# Patient Record
Sex: FEMALE | Race: WHITE | NOT HISPANIC OR LATINO | Employment: FULL TIME | URBAN - METROPOLITAN AREA
[De-identification: names, ages, dates, MRNs, and addresses within clinical notes are randomized per-mention and may not be internally consistent; named-entity substitution may affect disease eponyms.]

---

## 2017-05-09 ENCOUNTER — GENERIC CONVERSION - ENCOUNTER (OUTPATIENT)
Dept: OTHER | Facility: OTHER | Age: 57
End: 2017-05-09

## 2017-05-11 ENCOUNTER — ALLSCRIPTS OFFICE VISIT (OUTPATIENT)
Dept: OTHER | Facility: OTHER | Age: 57
End: 2017-05-11

## 2017-05-18 ENCOUNTER — GENERIC CONVERSION - ENCOUNTER (OUTPATIENT)
Dept: OTHER | Facility: OTHER | Age: 57
End: 2017-05-18

## 2017-05-31 ENCOUNTER — GENERIC CONVERSION - ENCOUNTER (OUTPATIENT)
Dept: OTHER | Facility: OTHER | Age: 57
End: 2017-05-31

## 2018-01-14 VITALS
WEIGHT: 182.25 LBS | BODY MASS INDEX: 36.74 KG/M2 | SYSTOLIC BLOOD PRESSURE: 126 MMHG | DIASTOLIC BLOOD PRESSURE: 70 MMHG | HEIGHT: 59 IN

## 2018-07-25 DIAGNOSIS — I10 ESSENTIAL HYPERTENSION: Primary | ICD-10-CM

## 2018-07-25 RX ORDER — TRIAMTERENE AND HYDROCHLOROTHIAZIDE 37.5; 25 MG/1; MG/1
TABLET ORAL
Qty: 90 TABLET | Refills: 0 | Status: SHIPPED | OUTPATIENT
Start: 2018-07-25 | End: 2018-10-23 | Stop reason: SDUPTHER

## 2018-07-25 NOTE — TELEPHONE ENCOUNTER
Please tell patient I refilled her blood pressure medication for 3 months but no refills after this until she comes in for a visit  Last visit was well over 1 year ago    She can schedule for health maintenance physical exam or follow up

## 2018-08-14 RX ORDER — MONTELUKAST SODIUM 10 MG/1
1 TABLET ORAL DAILY
COMMUNITY
Start: 2017-05-11 | End: 2022-01-12

## 2018-08-14 RX ORDER — ONDANSETRON 4 MG/1
4 TABLET, FILM COATED ORAL EVERY 8 HOURS
COMMUNITY
Start: 2015-09-02 | End: 2018-08-15 | Stop reason: ALTCHOICE

## 2018-08-14 RX ORDER — FAMOTIDINE 40 MG/1
40 TABLET, FILM COATED ORAL DAILY
Refills: 3 | COMMUNITY
Start: 2018-06-27

## 2018-08-14 RX ORDER — METHOCARBAMOL 750 MG/1
750 TABLET, FILM COATED ORAL DAILY
COMMUNITY
Start: 2018-04-04

## 2018-08-14 RX ORDER — LEVALBUTEROL INHALATION SOLUTION 1.25 MG/3ML
1.25 SOLUTION RESPIRATORY (INHALATION) AS NEEDED
COMMUNITY
Start: 2017-05-11

## 2018-08-14 RX ORDER — ACETAMINOPHEN 325 MG/1
325 TABLET ORAL
COMMUNITY
Start: 2014-05-08

## 2018-08-14 RX ORDER — OMEPRAZOLE 40 MG/1
1 CAPSULE, DELAYED RELEASE ORAL DAILY
COMMUNITY
Start: 2017-05-11 | End: 2019-09-12

## 2018-08-14 RX ORDER — GABAPENTIN 300 MG/1
300 CAPSULE ORAL 2 TIMES DAILY
Refills: 3 | COMMUNITY
Start: 2018-06-04

## 2018-08-14 RX ORDER — PREDNISONE 1 MG/1
5 TABLET ORAL DAILY
Refills: 3 | COMMUNITY
Start: 2018-06-27

## 2018-08-14 RX ORDER — DIPHENHYDRAMINE HCL 25 MG
25 CAPSULE ORAL
COMMUNITY
Start: 2014-05-08

## 2018-08-14 RX ORDER — GUAIFENESIN 100 MG/5ML
1 SYRUP ORAL
COMMUNITY
Start: 2014-05-08

## 2018-08-14 RX ORDER — PAROXETINE 30 MG/1
1 TABLET, FILM COATED ORAL DAILY
COMMUNITY
Start: 2017-05-11 | End: 2018-08-15 | Stop reason: SDUPTHER

## 2018-08-14 RX ORDER — PREDNISONE 1 MG/1
3 TABLET ORAL DAILY
Refills: 3 | COMMUNITY
Start: 2018-06-27 | End: 2019-09-12

## 2018-08-14 RX ORDER — IBUPROFEN 600 MG/1
600 TABLET ORAL EVERY 6 HOURS
COMMUNITY
End: 2019-09-12

## 2018-08-14 RX ORDER — LEVALBUTEROL TARTRATE 45 UG/1
2 AEROSOL, METERED ORAL AS NEEDED
COMMUNITY
Start: 2018-07-20

## 2018-08-14 RX ORDER — MOMETASONE FUROATE AND FORMOTEROL FUMARATE DIHYDRATE 200; 5 UG/1; UG/1
2 AEROSOL RESPIRATORY (INHALATION) AS NEEDED
COMMUNITY
Start: 2018-07-19

## 2018-08-14 RX ORDER — SUMATRIPTAN 100 MG/1
1 TABLET, FILM COATED ORAL
COMMUNITY
Start: 2017-09-13 | End: 2018-08-15 | Stop reason: SDUPTHER

## 2018-08-15 ENCOUNTER — OFFICE VISIT (OUTPATIENT)
Dept: FAMILY MEDICINE CLINIC | Facility: CLINIC | Age: 58
End: 2018-08-15
Payer: COMMERCIAL

## 2018-08-15 VITALS
HEART RATE: 104 BPM | SYSTOLIC BLOOD PRESSURE: 120 MMHG | HEIGHT: 60 IN | DIASTOLIC BLOOD PRESSURE: 70 MMHG | OXYGEN SATURATION: 98 % | BODY MASS INDEX: 35.53 KG/M2 | WEIGHT: 181 LBS

## 2018-08-15 DIAGNOSIS — E78.01 FAMILIAL HYPERCHOLESTEROLEMIA: ICD-10-CM

## 2018-08-15 DIAGNOSIS — J45.40 MODERATE PERSISTENT ASTHMA, UNSPECIFIED WHETHER COMPLICATED: ICD-10-CM

## 2018-08-15 DIAGNOSIS — Z86.69 HX OF MIGRAINE HEADACHES: ICD-10-CM

## 2018-08-15 DIAGNOSIS — M35.3 POLYMYALGIA RHEUMATICA (HCC): ICD-10-CM

## 2018-08-15 DIAGNOSIS — Z12.39 SCREENING FOR BREAST CANCER: ICD-10-CM

## 2018-08-15 DIAGNOSIS — Z12.11 SCREENING FOR COLON CANCER: ICD-10-CM

## 2018-08-15 DIAGNOSIS — F41.9 ANXIETY: Primary | ICD-10-CM

## 2018-08-15 PROBLEM — N95.1 MENOPAUSAL SYMPTOM: Status: ACTIVE | Noted: 2017-05-11

## 2018-08-15 PROBLEM — M51.36 LUMBAR DEGENERATIVE DISC DISEASE: Status: ACTIVE | Noted: 2017-05-09

## 2018-08-15 PROBLEM — J45.909 ASTHMA: Status: ACTIVE | Noted: 2017-05-11

## 2018-08-15 PROBLEM — K21.9 GERD (GASTROESOPHAGEAL REFLUX DISEASE): Status: ACTIVE | Noted: 2018-06-24

## 2018-08-15 PROBLEM — R51.9 HEADACHE: Status: ACTIVE | Noted: 2017-09-13

## 2018-08-15 PROBLEM — M51.369 LUMBAR DEGENERATIVE DISC DISEASE: Status: ACTIVE | Noted: 2017-05-09

## 2018-08-15 PROCEDURE — 1036F TOBACCO NON-USER: CPT | Performed by: FAMILY MEDICINE

## 2018-08-15 PROCEDURE — 3008F BODY MASS INDEX DOCD: CPT | Performed by: FAMILY MEDICINE

## 2018-08-15 PROCEDURE — 99214 OFFICE O/P EST MOD 30 MIN: CPT | Performed by: FAMILY MEDICINE

## 2018-08-15 RX ORDER — SUMATRIPTAN 100 MG/1
100 TABLET, FILM COATED ORAL ONCE AS NEEDED
Qty: 27 TABLET | Refills: 3 | Status: SHIPPED | OUTPATIENT
Start: 2018-08-15 | End: 2019-07-31 | Stop reason: SDUPTHER

## 2018-08-15 RX ORDER — MULTIVIT-MIN/IRON/FOLIC ACID/K 18-600-40
1 CAPSULE ORAL
COMMUNITY

## 2018-08-15 RX ORDER — PAROXETINE 30 MG/1
30 TABLET, FILM COATED ORAL DAILY
Qty: 90 TABLET | Refills: 3 | Status: SHIPPED | OUTPATIENT
Start: 2018-08-15 | End: 2019-07-31 | Stop reason: SDUPTHER

## 2018-08-15 NOTE — ASSESSMENT & PLAN NOTE
Asthma has been better controlled since she started using the [facility] inhaler  She will continue with home nebulizer treatments as needed

## 2018-08-15 NOTE — PROGRESS NOTES
Assessment/Plan:    Anxiety    Her symptoms are under good control on paroxetine which was refilled today  Polymyalgia rheumatica ([facility] [address] )    She is currently doing a very gradual prednisone taper  She will continue follow-up with her rheumatologist at [facility]  Asthma    Asthma has been better controlled since she started using the [facility] inhaler  She will continue with home nebulizer treatments as needed  Diagnoses and all orders for this visit:    Anxiety  -     PARoxetine (PAXIL) 30 mg tablet; Take 1 tablet (30 mg total) by mouth daily    Polymyalgia rheumatica (HCC)    Moderate persistent asthma, unspecified whether complicated    Hx of migraine headaches  -     SUMAtriptan (IMITREX) 100 mg tablet; Take 1 tablet (100 mg total) by mouth once as needed for migraine for up to 1 dose    Screening for breast cancer  -     Mammo screening bilateral w 3d & cad; Future    Screening for colon cancer  -     Occult Bloood,Fecal Immunochemical; Future    Familial hypercholesterolemia  -     Lipid panel; Future  -     Hepatitis C antibody; Future    Other orders  -     Cholecalciferol (VITAMIN D) 2000 units CAPS; Take 2 capsules by mouth          Subjective:      Patient ID: Shonda Wood is a 61 y o  female  She is here for follow-up  She has been having troubles with fibromyalgia flaring  Currently she is taking prednisone 8 mg daily, which is down from 10 mg daily  In addition she has had difficulties with asthma symptoms  She has chronic allergic rhinitis and has had asthma for the past 6 years or so  Recently she started using the [facility] inhaler which has been quite helpful  She went from exacerbations to 3 times per week to once every other week  She is overdue for mammogram but willing to schedule 1  She has never had a colonoscopy  She is willing to do a fit test     The Paxil has been managing for anxiety  Her semester will be starting next week    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  The following portions of the patient's history were reviewed and updated as appropriate: allergies, current medications, past family history, past medical history, past social history, past surgical history and problem list     Review of Systems   Constitutional: Negative for activity change, chills, fatigue and fever  HENT: Positive for congestion  Negative for ear pain, sinus pressure and sore throat  Eyes: Negative for pain and visual disturbance  Respiratory: Negative for cough, chest tightness, shortness of breath and wheezing  Cardiovascular: Negative for chest pain, palpitations and leg swelling  Gastrointestinal: Negative for abdominal pain, blood in stool, constipation, diarrhea, nausea and vomiting  Endocrine: Negative for polydipsia and polyuria  Genitourinary: Negative for difficulty urinating, dysuria, frequency and urgency  Musculoskeletal: Positive for arthralgias and myalgias  Negative for joint swelling  Skin: Negative for rash  Neurological: Positive for headaches  Negative for dizziness, weakness and numbness  Hematological: Negative for adenopathy  Does not bruise/bleed easily  Psychiatric/Behavioral: Negative for dysphoric mood  The patient is not nervous/anxious  Objective:      /70   Pulse 104   Ht 5' (1 524 m)   Wt 82 1 kg (181 lb)   SpO2 98%   BMI 35 35 kg/m²          Physical Exam   Constitutional: She is oriented to person, place, and time  She appears well-developed and well-nourished  No distress  HENT:   Head: Normocephalic and atraumatic  Right Ear: External ear normal    Left Ear: External ear normal    Nose: Nose normal    Mouth/Throat: Oropharynx is clear and moist    Eyes: Conjunctivae and EOM are normal  Pupils are equal, round, and reactive to light  No scleral icterus  Neck: Normal range of motion  Neck supple  No thyromegaly present     Cardiovascular: Normal rate, regular rhythm and normal heart sounds  No murmur heard  Pulmonary/Chest: Effort normal and breath sounds normal  She has no wheezes  She has no rales  Abdominal: Soft  Bowel sounds are normal  She exhibits no mass  There is no tenderness  Musculoskeletal: She exhibits no tenderness or deformity  Lymphadenopathy:     She has no cervical adenopathy  Neurological: She is alert and oriented to person, place, and time  She has normal reflexes  No cranial nerve deficit  Skin: Skin is warm and dry  No rash noted  No erythema  Psychiatric: She has a normal mood and affect  Her behavior is normal    Nursing note and vitals reviewed

## 2018-08-15 NOTE — PATIENT INSTRUCTIONS
There is a new shingles vaccine available called Shingrix  It is recommended after age 48, even if you have already had the Zostavax shingles vaccine  First you should contact your insurance to make sure it is covered, and then you can go to the pharmacy for the vaccine  There are 2 doses of vaccine  by 2-6 months

## 2018-08-15 NOTE — ASSESSMENT & PLAN NOTE
She is currently doing a very gradual prednisone taper  She will continue follow-up with her rheumatologist at [facility] [place] [employer]

## 2018-10-04 DIAGNOSIS — E78.00 HYPERCHOLESTEREMIA: Primary | ICD-10-CM

## 2018-10-04 RX ORDER — EZETIMIBE 10 MG/1
10 TABLET ORAL DAILY
Qty: 30 TABLET | Refills: 3 | Status: SHIPPED | OUTPATIENT
Start: 2018-10-04 | End: 2019-09-12 | Stop reason: SINTOL

## 2018-10-23 DIAGNOSIS — I10 ESSENTIAL HYPERTENSION: ICD-10-CM

## 2018-10-23 RX ORDER — TRIAMTERENE AND HYDROCHLOROTHIAZIDE 37.5; 25 MG/1; MG/1
TABLET ORAL
Qty: 90 TABLET | Refills: 0 | Status: SHIPPED | OUTPATIENT
Start: 2018-10-23 | End: 2019-01-11 | Stop reason: SDUPTHER

## 2019-01-11 DIAGNOSIS — I10 ESSENTIAL HYPERTENSION: ICD-10-CM

## 2019-01-12 RX ORDER — TRIAMTERENE AND HYDROCHLOROTHIAZIDE 37.5; 25 MG/1; MG/1
TABLET ORAL
Qty: 90 TABLET | Refills: 0 | Status: SHIPPED | OUTPATIENT
Start: 2019-01-12 | End: 2019-04-01 | Stop reason: SDUPTHER

## 2019-02-28 ENCOUNTER — APPOINTMENT (OUTPATIENT)
Dept: URGENT CARE | Age: 59
End: 2019-02-28
Payer: OTHER MISCELLANEOUS

## 2019-02-28 ENCOUNTER — APPOINTMENT (OUTPATIENT)
Dept: RADIOLOGY | Age: 59
End: 2019-02-28
Payer: OTHER MISCELLANEOUS

## 2019-02-28 ENCOUNTER — TRANSCRIBE ORDERS (OUTPATIENT)
Dept: ADMINISTRATIVE | Age: 59
End: 2019-02-28

## 2019-02-28 DIAGNOSIS — T14.90XA INJURY: Primary | ICD-10-CM

## 2019-02-28 DIAGNOSIS — T14.90XA INJURY: ICD-10-CM

## 2019-02-28 PROCEDURE — 73080 X-RAY EXAM OF ELBOW: CPT

## 2019-02-28 PROCEDURE — 99284 EMERGENCY DEPT VISIT MOD MDM: CPT | Performed by: PREVENTIVE MEDICINE

## 2019-02-28 PROCEDURE — G0383 LEV 4 HOSP TYPE B ED VISIT: HCPCS | Performed by: PREVENTIVE MEDICINE

## 2019-03-01 ENCOUNTER — APPOINTMENT (OUTPATIENT)
Dept: URGENT CARE | Age: 59
End: 2019-03-01
Payer: OTHER MISCELLANEOUS

## 2019-03-01 PROCEDURE — 99213 OFFICE O/P EST LOW 20 MIN: CPT | Performed by: PREVENTIVE MEDICINE

## 2019-03-11 ENCOUNTER — APPOINTMENT (OUTPATIENT)
Dept: URGENT CARE | Age: 59
End: 2019-03-11
Payer: OTHER MISCELLANEOUS

## 2019-03-11 PROCEDURE — 99214 OFFICE O/P EST MOD 30 MIN: CPT | Performed by: PREVENTIVE MEDICINE

## 2019-03-19 ENCOUNTER — APPOINTMENT (OUTPATIENT)
Dept: URGENT CARE | Age: 59
End: 2019-03-19
Payer: OTHER MISCELLANEOUS

## 2019-03-19 PROCEDURE — 99213 OFFICE O/P EST LOW 20 MIN: CPT | Performed by: PREVENTIVE MEDICINE

## 2019-04-01 DIAGNOSIS — I10 ESSENTIAL HYPERTENSION: ICD-10-CM

## 2019-04-01 RX ORDER — TRIAMTERENE AND HYDROCHLOROTHIAZIDE 37.5; 25 MG/1; MG/1
TABLET ORAL
Qty: 90 TABLET | Refills: 0 | Status: SHIPPED | OUTPATIENT
Start: 2019-04-01 | End: 2019-07-09 | Stop reason: SDUPTHER

## 2019-07-09 DIAGNOSIS — I10 ESSENTIAL HYPERTENSION: ICD-10-CM

## 2019-07-09 RX ORDER — TRIAMTERENE AND HYDROCHLOROTHIAZIDE 37.5; 25 MG/1; MG/1
TABLET ORAL
Qty: 90 TABLET | Refills: 0 | Status: SHIPPED | OUTPATIENT
Start: 2019-07-09 | End: 2019-10-07 | Stop reason: SDUPTHER

## 2019-07-10 NOTE — TELEPHONE ENCOUNTER
L/m for pt to call office and schedule appt - prior to requesting further medication refills - mailed letter also

## 2019-07-31 DIAGNOSIS — Z86.69 HX OF MIGRAINE HEADACHES: ICD-10-CM

## 2019-07-31 DIAGNOSIS — F41.9 ANXIETY: ICD-10-CM

## 2019-07-31 RX ORDER — PAROXETINE 30 MG/1
TABLET, FILM COATED ORAL
Qty: 90 TABLET | Refills: 3 | Status: SHIPPED | OUTPATIENT
Start: 2019-07-31 | End: 2021-01-06 | Stop reason: ALTCHOICE

## 2019-07-31 RX ORDER — SUMATRIPTAN 100 MG/1
TABLET, FILM COATED ORAL
Qty: 27 TABLET | Refills: 3 | Status: SHIPPED | OUTPATIENT
Start: 2019-07-31 | End: 2020-08-07

## 2019-09-09 LAB
HEPATITIS C ANTIBODY (HISTORICAL): NEGATIVE
HEPATITIS C ANTIBODY CONFIRMATION (HISTORICAL): NEGATIVE

## 2019-09-12 ENCOUNTER — OFFICE VISIT (OUTPATIENT)
Dept: FAMILY MEDICINE CLINIC | Facility: CLINIC | Age: 59
End: 2019-09-12
Payer: COMMERCIAL

## 2019-09-12 VITALS
OXYGEN SATURATION: 97 % | DIASTOLIC BLOOD PRESSURE: 70 MMHG | TEMPERATURE: 98.8 F | BODY MASS INDEX: 36.29 KG/M2 | SYSTOLIC BLOOD PRESSURE: 118 MMHG | HEART RATE: 90 BPM | WEIGHT: 180 LBS | HEIGHT: 59 IN

## 2019-09-12 DIAGNOSIS — M35.3 POLYMYALGIA RHEUMATICA (HCC): ICD-10-CM

## 2019-09-12 DIAGNOSIS — Z00.00 ANNUAL PHYSICAL EXAM: Primary | ICD-10-CM

## 2019-09-12 DIAGNOSIS — E66.9 OBESITY, CLASS II, BMI 35-39.9: ICD-10-CM

## 2019-09-12 DIAGNOSIS — Z23 NEED FOR INFLUENZA VACCINATION: ICD-10-CM

## 2019-09-12 DIAGNOSIS — Z23 FLU VACCINE NEED: ICD-10-CM

## 2019-09-12 DIAGNOSIS — Z12.39 BREAST CANCER SCREENING: ICD-10-CM

## 2019-09-12 DIAGNOSIS — E78.5 HYPERLIPIDEMIA, UNSPECIFIED HYPERLIPIDEMIA TYPE: ICD-10-CM

## 2019-09-12 DIAGNOSIS — K90.9 MALABSORPTION OF IRON: ICD-10-CM

## 2019-09-12 PROBLEM — D50.8 IRON DEFICIENCY ANEMIA SECONDARY TO INADEQUATE DIETARY IRON INTAKE: Status: ACTIVE | Noted: 2019-04-23

## 2019-09-12 PROBLEM — M31.6 GIANT CELL ARTERITIS SYNDROME (HCC): Status: ACTIVE | Noted: 2018-12-17

## 2019-09-12 PROCEDURE — 90471 IMMUNIZATION ADMIN: CPT | Performed by: FAMILY MEDICINE

## 2019-09-12 PROCEDURE — 90682 RIV4 VACC RECOMBINANT DNA IM: CPT | Performed by: FAMILY MEDICINE

## 2019-09-12 PROCEDURE — 99396 PREV VISIT EST AGE 40-64: CPT | Performed by: FAMILY MEDICINE

## 2019-09-12 NOTE — PATIENT INSTRUCTIONS

## 2019-09-12 NOTE — ASSESSMENT & PLAN NOTE
She continues with joint aches  She sees Rheumatology Dr Kaci Romo regularly  She is stable on prednisone 5 mg daily

## 2019-09-12 NOTE — PROGRESS NOTES
ADULT ANNUAL [facility]    NAME: Mili Workman  AGE: 60 y o  SEX: female  : 1960     DATE: 2019     Assessment and Plan:     Problem List Items Addressed This Visit        Digestive    Malabsorption of iron     She has received iron transfusions and most recent hemoglobin was normal at 13 4  Other    Polymyalgia rheumatica ([address] )     She continues with joint aches  She sees Rheumatology Dr Kirsten Scott regularly  She is stable on prednisone 5 mg daily  Other Visit Diagnoses     Breast cancer screening    -  Primary    Need for influenza vaccination              Immunizations and preventive care screenings were discussed with patient today  Appropriate education was printed on patient's after visit summary  Counseling:  · Exercise: the importance of regular exercise/physical activity was discussed  Recommend exercise 3-5 times per week for at least 30 minutes  BMI Counseling: Body mass index is 36 05 kg/m²  The BMI is above normal  Nutrition recommendations include decreasing portion sizes and encouraging healthy choices of fruits and vegetables  Exercise recommendations include exercising 3-5 times per week  Patient referred to PCP due to patient being overweight  No follow-ups on file  Chief Complaint:     Chief Complaint   Patient presents with    Annual Exam      History of Present Illness:     Adult Annual Physical   Patient here for a comprehensive physical exam  The patient reports no problems  Diet and Physical Activity  · Diet/Nutrition: well balanced diet and adequate fiber intake  · Exercise: walking        Depression Screening  PHQ-9 Depression Screening    PHQ-9:    Frequency of the following problems over the past two weeks:       Little interest or pleasure in doing things:  0 - not at all  Feeling down, depressed, or hopeless:  0 - not at all  PHQ-2 Score: 0       General Health  · Sleep: sleeps well and gets 7-8 hours of sleep on average  · Hearing: normal - bilateral   · Vision: goes for regular eye exams and wears glasses  · Dental: regular dental visits, brushes teeth twice daily and flosses teeth occasionally  /GYN Health  · Patient is: postmenopausal  · Last menstrual period: 2011  · Contraceptive method: none  Review of Systems:     Review of Systems   Constitutional: Negative for activity change, chills, fatigue and fever  HENT: Positive for congestion  Negative for ear pain, sinus pressure and sore throat  Eyes: Negative for pain and visual disturbance  Respiratory: Positive for cough  Negative for chest tightness, shortness of breath and wheezing  Cardiovascular: Negative for chest pain, palpitations and leg swelling  Gastrointestinal: Negative for abdominal pain, blood in stool, constipation, diarrhea, nausea and vomiting  Endocrine: Negative for polydipsia and polyuria  Genitourinary: Negative for difficulty urinating, dysuria, frequency and urgency  Musculoskeletal: Positive for myalgias  Negative for arthralgias and joint swelling  Skin: Negative for rash  Neurological: Negative for dizziness, weakness, numbness and headaches  Hematological: Negative for adenopathy  Does not bruise/bleed easily  Psychiatric/Behavioral: Negative for dysphoric mood  The patient is not nervous/anxious  Past Medical History:     No past medical history on file     Past Surgical History:     Past Surgical History:   Procedure Laterality Date    ARTHROTOMY KNEE Right 06/20/2014    medial meniscectomy    CERVICAL FUSION  2013    vertebral, l4 l5 also cyst removal    REPLACEMENT TOTAL KNEE  10/2015      Social History:     Social History     Socioeconomic History    Marital status: /Civil Union     Spouse name: None    Number of children: None    Years of education: None    Highest education level: None   Occupational History    None   Social Needs    Financial resource strain: None    Food insecurity:     Worry: None     Inability: None    Transportation needs:     Medical: None     Non-medical: None   Tobacco Use    Smoking status: Never Smoker    Smokeless tobacco: Never Used   Substance and Sexual Activity    Alcohol use: Yes     Frequency: Monthly or less     Comment: occ    Drug use: No    Sexual activity: None   Lifestyle    Physical activity:     Days per week: None     Minutes per session: None    Stress: None   Relationships    Social connections:     Talks on phone: None     Gets together: None     Attends Yazidi service: None     Active member of club or organization: None     Attends meetings of clubs or organizations: None     Relationship status: None    Intimate partner violence:     Fear of current or ex partner: None     Emotionally abused: None     Physically abused: None     Forced sexual activity: None   Other Topics Concern    None   Social History Narrative    Always uses seatbelt    Daily caffeine    No guns in the home      Family History:     Family History   Problem Relation Age of Onset    Hypertension Mother     Prostate cancer Father       Current Medications:     Current Outpatient Medications   Medication Sig Dispense Refill    acetaminophen (TYLENOL) 325 mg tablet Take 325 mg by mouth      Cholecalciferol (VITAMIN D) 2000 units CAPS Take 2 capsules by mouth      diphenhydrAMINE (BENADRYL) 25 mg capsule Take 25 mg by mouth      DULERA 200-5 MCG/ACT inhaler Inhale 2 puffs as needed       famotidine (PEPCID) 40 MG tablet Take 40 mg by mouth daily  3    gabapentin (NEURONTIN) 300 mg capsule Take 300 mg by mouth 2 (two) times a day   3    guaiFENesin (ROBITUSSIN) 100 mg/5 mL syrup Take 1 tablet by mouth      levalbuterol (XOPENEX HFA) 45 mcg/act inhaler Inhale 2 puffs as needed       levalbuterol (XOPENEX) 1 25 mg/3 mL nebulizer solution Inhale 1 25 mg as needed       methocarbamol (ROBAXIN) 750 mg tablet Take 750 mg by mouth as needed       montelukast (SINGULAIR) 10 mg tablet Take 1 tablet by mouth daily      PARoxetine (PAXIL) 30 mg tablet TAKE 1 TABLET DAILY 90 tablet 3    predniSONE 5 mg tablet Take 10 mg by mouth daily  3    SUMAtriptan (IMITREX) 100 mg tablet TAKE 1 TABLET ONCE AS      NEEDED FOR MIGRAINE FOR UP TO 1 DOSE (Patient taking differently: Take 100 mg by mouth once as needed ) 27 tablet 3    triamterene-hydrochlorothiazide (MAXZIDE-25) 37 5-25 mg per tablet TAKE 1 TABLET ONCE DAILY 90 tablet 0     No current facility-administered medications for this visit  Allergies: Allergies   Allergen Reactions    Fluticasone-Salmeterol Other (See Comments)     Other reaction(s): Shaking    Morphine      Other reaction(s): Hypotension    Pollen Extract     Simvastatin Myalgia    Statins Myalgia    Zetia [Ezetimibe]      Tiredness, nausea, body aches    Chlorhexidine Rash    Penicillins Rash    Sulfa Antibiotics Rash     Pt claims to not have allergy to sulfa drugs      Physical Exam:     /70 (BP Location: Left arm, Patient Position: Sitting, Cuff Size: Standard)   Pulse 90   Temp 98 8 °F (37 1 °C) (Tympanic)   Ht 4' 11 25" (1 505 m)   Wt 81 6 kg (180 lb)   SpO2 97%   BMI 36 05 kg/m²     Physical Exam   Constitutional: She is oriented to person, place, and time  She appears well-developed and well-nourished  No distress  obese   HENT:   Head: Normocephalic and atraumatic  Right Ear: External ear normal    Left Ear: External ear normal    Nose: Nose normal    Mouth/Throat: Oropharynx is clear and moist    Eyes: Pupils are equal, round, and reactive to light  Conjunctivae and EOM are normal  No scleral icterus  Neck: Normal range of motion  Neck supple  No thyromegaly present  Cardiovascular: Normal rate, regular rhythm, normal heart sounds and intact distal pulses  No murmur heard    Pulmonary/Chest: Effort normal and breath sounds normal  She has no wheezes  She has no rales  Abdominal: Soft  Bowel sounds are normal  She exhibits no mass  There is no tenderness  Musculoskeletal: She exhibits no tenderness or deformity  Lymphadenopathy:     She has no cervical adenopathy  Neurological: She is alert and oriented to person, place, and time  She has normal reflexes  No cranial nerve deficit  Skin: Skin is warm and dry  Capillary refill takes less than 2 seconds  No rash noted  No erythema  Psychiatric: She has a normal mood and affect  Her behavior is normal    Nursing note and vitals reviewed        MD [facility] [id number]

## 2019-10-07 DIAGNOSIS — I10 ESSENTIAL HYPERTENSION: ICD-10-CM

## 2019-10-07 RX ORDER — TRIAMTERENE AND HYDROCHLOROTHIAZIDE 37.5; 25 MG/1; MG/1
TABLET ORAL
Qty: 90 TABLET | Refills: 0 | Status: SHIPPED | OUTPATIENT
Start: 2019-10-07 | End: 2020-01-05

## 2019-11-21 ENCOUNTER — TELEPHONE (OUTPATIENT)
Dept: FAMILY MEDICINE CLINIC | Facility: CLINIC | Age: 59
End: 2019-11-21

## 2019-11-21 NOTE — TELEPHONE ENCOUNTER
Pt called back re lab results  Pt states she has previously tried zetia and had the same effects as the statin medications  Pt states she is unable to take fish oil as she is allergic to fish and in the past tried the fish oil OTC and broke out in hives    Pt also wanted to let you know she currently is getting iron infusions per her hematologist

## 2019-11-24 ENCOUNTER — OFFICE VISIT (OUTPATIENT)
Dept: URGENT CARE | Facility: MEDICAL CENTER | Age: 59
End: 2019-11-24
Payer: COMMERCIAL

## 2019-11-24 VITALS
HEIGHT: 60 IN | DIASTOLIC BLOOD PRESSURE: 65 MMHG | HEART RATE: 86 BPM | WEIGHT: 180 LBS | BODY MASS INDEX: 35.34 KG/M2 | SYSTOLIC BLOOD PRESSURE: 109 MMHG | TEMPERATURE: 98 F | RESPIRATION RATE: 16 BRPM | OXYGEN SATURATION: 95 %

## 2019-11-24 DIAGNOSIS — J00 ACUTE NASOPHARYNGITIS: Primary | ICD-10-CM

## 2019-11-24 LAB — S PYO AG THROAT QL: NEGATIVE

## 2019-11-24 PROCEDURE — 87070 CULTURE OTHR SPECIMN AEROBIC: CPT | Performed by: PHYSICIAN ASSISTANT

## 2019-11-24 PROCEDURE — 87880 STREP A ASSAY W/OPTIC: CPT | Performed by: PHYSICIAN ASSISTANT

## 2019-11-24 PROCEDURE — 99213 OFFICE O/P EST LOW 20 MIN: CPT | Performed by: PHYSICIAN ASSISTANT

## 2019-11-24 RX ORDER — METHYLPREDNISOLONE 4 MG/1
TABLET ORAL
Qty: 21 EACH | Refills: 0 | Status: SHIPPED | OUTPATIENT
Start: 2019-11-24 | End: 2021-01-06 | Stop reason: ALTCHOICE

## 2019-11-24 RX ORDER — BENZONATATE 100 MG/1
100 CAPSULE ORAL 3 TIMES DAILY PRN
Qty: 15 CAPSULE | Refills: 0 | Status: SHIPPED | OUTPATIENT
Start: 2019-11-24 | End: 2021-01-06 | Stop reason: ALTCHOICE

## 2019-11-24 NOTE — PATIENT INSTRUCTIONS
Take Medrol Dosepak as directed  Hold prednisone until finish with Dosepak  Use Tessalon as directed for cough  Start using nasal spray daily  Continue home medications as directed  Follow up with PCP in 3-5 days  Proceed to  ER if symptoms worsen  Upper Respiratory Infection   AMBULATORY CARE:   An upper respiratory infection  is also called a common cold  It can affect your nose, throat, ears, and sinuses  Common signs and symptoms include the following:  Cold symptoms are usually worst for the first 3 to 5 days  You may have any of the following:  · Runny or stuffy nose    · Sneezing and coughing    · Sore throat or hoarseness    · Red, watery, and sore eyes    · Fatigue     · Chills and fever    · Headache, body aches, or sore muscles  Seek care immediately if:   · You have chest pain or trouble breathing  Contact your healthcare provider if:   · You have a fever over 102ºF (39°C)  · Your sore throat gets worse or you see white or yellow spots in your throat  · Your symptoms get worse after 3 to 5 days or your cold is not better in 14 days  · You have a rash anywhere on your skin  · You have large, tender lumps in your neck  · You have thick, green or yellow drainage from your nose  · You cough up thick yellow, green, or bloody mucus  · You have vomiting for more than 24 hours and cannot keep fluids down  · You have a bad earache  · You have questions or concerns about your condition or care  Treatment for a cold: There is no cure for the common cold  Colds are caused by viruses and do not get better with antibiotics  Most people get better in 7 to 14 days  You may continue to cough for 2 to 3 weeks  The following may help decrease your symptoms:  · Decongestants  help reduce nasal congestion and help you breathe more easily  If you take decongestant pills, they may make you feel restless or not able to sleep   Do not use decongestant sprays for more than a few days     · Cough suppressants  help reduce coughing  Ask your healthcare provider which type of cough medicine is best for you  · NSAIDs , such as ibuprofen, help decrease swelling, pain, and fever  NSAIDs can cause stomach bleeding or kidney problems in certain people  If you take blood thinner medicine, always ask your healthcare provider if NSAIDs are safe for you  Always read the medicine label and follow directions  · Acetaminophen  decreases pain and fever  It is available without a doctor's order  Ask how much to take and how often to take it  Follow directions  Read the labels of all other medicines you are using to see if they also contain acetaminophen, or ask your doctor or pharmacist  Acetaminophen can cause liver damage if not taken correctly  Do not use more than 4 grams (4,000 milligrams) total of acetaminophen in one day  Manage your cold:   · Rest as much as possible  Slowly start to do more each day  · Drink more liquids as directed  Liquids will help thin and loosen mucus so you can cough it up  Liquids will also help prevent dehydration  Liquids that help prevent dehydration include water, fruit juice, and broth  Do not drink liquids that contain caffeine  Caffeine can increase your risk for dehydration  Ask your healthcare provider how much liquid to drink each day  · Soothe a sore throat  Gargle with warm salt water  This helps your sore throat feel better  Make salt water by dissolving ¼ teaspoon salt in 1 cup warm water  You may also suck on hard candy or throat lozenges  You may use a sore throat spray  · Use a humidifier or vaporizer  Use a cool mist humidifier or a vaporizer to increase air moisture in your home  This may make it easier for you to breathe and help decrease your cough  · Use saline nasal drops as directed  These help relieve congestion  · Apply petroleum-based jelly around the outside of your nostrils    This can decrease irritation from blowing your nose  · Do not smoke  Nicotine and other chemicals in cigarettes and cigars can make your symptoms worse  They can also cause infections such as bronchitis or pneumonia  Ask your healthcare provider for information if you currently smoke and need help to quit  E-cigarettes or smokeless tobacco still contain nicotine  Talk to your healthcare provider before you use these products  Prevent spreading your cold to others:   · Try to stay away from other people during the first 2 to 3 days of your cold when it is more easily spread  · Do not share food or drinks  · Do not share hand towels with household members  · Wash your hands often, especially after you blow your nose  Turn away from other people and cover your mouth and nose with a tissue when you sneeze or cough  Follow up with your healthcare provider as directed:  Write down your questions so you remember to ask them during your visits  © 2017 [address]  Information is for End User's use only and may not be sold, redistributed or otherwise used for commercial purposes  All illustrations and images included in CareNotes® are the copyrighted property of [employer]  or [employer]  The above information is an  only  It is not intended as medical advice for individual conditions or treatments  Talk to your doctor, nurse or pharmacist before following any medical regimen to see if it is safe and effective for you

## 2019-11-24 NOTE — PROGRESS NOTES
[facility]        NAME: Iman Beth is a 60 y o  female  : 1960    MRN: [id number]  DATE: 2019  TIME: 2:10 PM    Assessment and Plan   Acute nasopharyngitis [J00]  1  Acute nasopharyngitis  POCT rapid strepA    methylPREDNISolone 4 MG tablet therapy pack    benzonatate (TESSALON PERLES) 100 mg capsule    Throat culture         Patient Instructions     Take Medrol Dosepak as directed  Hold prednisone until finish with Dosepak  Use Tessalon as directed for cough  Start using nasal spray daily  Continue home medications as directed  Follow up with PCP in 3-5 days  Proceed to  ER if symptoms worsen  Chief Complaint     Chief Complaint   Patient presents with    Sore Throat     began yesterday with sore throat and head congestion         History of Present Illness       58-year-old female presents with 2 day history of runny nose head congestion and cough and sore throat  Denies any fevers or chills  No chest pain wheezing  No abdominal pain nausea vomiting diarrhea  Patient has history of asthma has will have her has not been using her nebulizers  Denies any ear pain  Reports that she thinks she gets the sinus infection and needs a Z-Chidi  Sinusitis   This is a new problem  The current episode started yesterday  The problem is unchanged  There has been no fever  The pain is mild  Associated symptoms include congestion, coughing, sinus pressure and a sore throat  Pertinent negatives include no chills, diaphoresis, ear pain or headaches  Past treatments include nothing  The treatment provided no relief  Review of Systems   Review of Systems   Constitutional: Negative  Negative for chills and diaphoresis  HENT: Positive for congestion, sinus pressure and sore throat  Negative for ear pain  Eyes: Negative  Respiratory: Positive for cough  Cardiovascular: Negative  Gastrointestinal: Negative  Musculoskeletal: Negative  Skin: Negative  Neurological: Negative  Negative for headaches           Current Medications       Current Outpatient Medications:     acetaminophen (TYLENOL) 325 mg tablet, Take 325 mg by mouth, Disp: , Rfl:     Cholecalciferol (VITAMIN D) 2000 units CAPS, Take 2 capsules by mouth, Disp: , Rfl:     diphenhydrAMINE (BENADRYL) 25 mg capsule, Take 25 mg by mouth, Disp: , Rfl:     DULERA 200-5 MCG/ACT inhaler, Inhale 2 puffs as needed , Disp: , Rfl:     famotidine (PEPCID) 40 MG tablet, Take 40 mg by mouth daily, Disp: , Rfl: 3    gabapentin (NEURONTIN) 300 mg capsule, Take 300 mg by mouth 2 (two) times a day , Disp: , Rfl: 3    guaiFENesin (ROBITUSSIN) 100 mg/5 mL syrup, Take 1 tablet by mouth, Disp: , Rfl:     levalbuterol (XOPENEX HFA) 45 mcg/act inhaler, Inhale 2 puffs as needed , Disp: , Rfl:     levalbuterol (XOPENEX) 1 25 mg/3 mL nebulizer solution, Inhale 1 25 mg as needed , Disp: , Rfl:     methocarbamol (ROBAXIN) 750 mg tablet, Take 750 mg by mouth as needed , Disp: , Rfl:     montelukast (SINGULAIR) 10 mg tablet, Take 1 tablet by mouth daily, Disp: , Rfl:     PARoxetine (PAXIL) 30 mg tablet, TAKE 1 TABLET DAILY, Disp: 90 tablet, Rfl: 3    predniSONE 5 mg tablet, Take 10 mg by mouth daily, Disp: , Rfl: 3    SUMAtriptan (IMITREX) 100 mg tablet, TAKE 1 TABLET ONCE AS      NEEDED FOR MIGRAINE FOR UP TO 1 DOSE (Patient taking differently: Take 100 mg by mouth once as needed ), Disp: 27 tablet, Rfl: 3    triamterene-hydrochlorothiazide (MAXZIDE-25) 37 5-25 mg per tablet, TAKE 1 TABLET ONCE DAILY, Disp: 90 tablet, Rfl: 0    benzonatate (TESSALON PERLES) 100 mg capsule, Take 1 capsule (100 mg total) by mouth 3 (three) times a day as needed for cough, Disp: 15 capsule, Rfl: 0    methylPREDNISolone 4 MG tablet therapy pack, Use as directed on package, Disp: 21 each, Rfl: 0    Current Allergies     Allergies as of 11/24/2019 - Reviewed 11/24/2019   Allergen Reaction Noted    Fluticasone-salmeterol Other (See Comments) 05/06/2015    Morphine  05/06/2015    Pollen extract  05/11/2017    Simvastatin Myalgia     Statins Myalgia 05/11/2017    Zetia [ezetimibe]  09/12/2019    Chlorhexidine Rash     Penicillins Rash 05/11/2017    Sulfa antibiotics Rash 05/11/2017            The following portions of the patient's history were reviewed and updated as appropriate: allergies, current medications, past family history, past medical history, past social history, past surgical history and problem list      Past Medical History:   Diagnosis Date    Allergic     Asthma     Fibromyalgia     GERD (gastroesophageal reflux disease)     Migraine     Polymyalgia rheumatica ([address] )        Past Surgical History:   Procedure Laterality Date    ARTHROTOMY KNEE Right 06/20/2014    medial meniscectomy    CERVICAL FUSION  2013    vertebral, l4 l5 also cyst removal    REPLACEMENT TOTAL KNEE  10/2015       Family History   Problem Relation Age of Onset    Hypertension Mother     Prostate cancer Father          Medications have been verified  Objective   /65   Pulse 86   Temp 98 °F (36 7 °C) (Tympanic)   Resp 16   Ht 5' (1 524 m)   Wt 81 6 kg (180 lb)   SpO2 95%   BMI 35 15 kg/m²        Physical Exam     Physical Exam   Constitutional: She is oriented to person, place, and time  She appears well-developed and well-nourished  No distress  HENT:   Head: Normocephalic and atraumatic  Right Ear: Hearing, tympanic membrane, external ear and ear canal normal    Left Ear: Hearing, tympanic membrane, external ear and ear canal normal    Nose: Nose normal    Mouth/Throat: Uvula is midline and mucous membranes are normal  Posterior oropharyngeal erythema (Mild) present  No oropharyngeal exudate  Eyes: Conjunctivae and EOM are normal  Right eye exhibits no discharge  Left eye exhibits no discharge  Neck: Normal range of motion  Neck supple     Cardiovascular: Normal rate, regular rhythm, normal heart sounds and intact distal pulses  No murmur heard  Pulmonary/Chest: Effort normal and breath sounds normal  No respiratory distress  She has no wheezes  She has no rales  Abdominal: Soft  Bowel sounds are normal  There is no tenderness  Musculoskeletal: Normal range of motion  Lymphadenopathy:     She has no cervical adenopathy  Neurological: She is alert and oriented to person, place, and time  Skin: Skin is warm and dry  Psychiatric: She has a normal mood and affect  Nursing note and vitals reviewed

## 2019-11-26 LAB — BACTERIA THROAT CULT: NORMAL

## 2020-01-05 DIAGNOSIS — I10 ESSENTIAL HYPERTENSION: ICD-10-CM

## 2020-01-05 RX ORDER — TRIAMTERENE AND HYDROCHLOROTHIAZIDE 37.5; 25 MG/1; MG/1
TABLET ORAL
Qty: 90 TABLET | Refills: 0 | Status: SHIPPED | OUTPATIENT
Start: 2020-01-05 | End: 2021-01-06 | Stop reason: SDUPTHER

## 2020-08-07 DIAGNOSIS — Z86.69 HX OF MIGRAINE HEADACHES: ICD-10-CM

## 2020-08-07 RX ORDER — SUMATRIPTAN 100 MG/1
100 TABLET, FILM COATED ORAL ONCE AS NEEDED
Qty: 27 TABLET | Refills: 0 | Status: SHIPPED | OUTPATIENT
Start: 2020-08-07 | End: 2021-01-06 | Stop reason: SDUPTHER

## 2020-10-27 DIAGNOSIS — Z86.69 HX OF MIGRAINE HEADACHES: ICD-10-CM

## 2020-10-27 RX ORDER — SUMATRIPTAN 100 MG/1
TABLET, FILM COATED ORAL
Qty: 27 TABLET | Refills: 0 | OUTPATIENT
Start: 2020-10-27

## 2020-12-04 DIAGNOSIS — Z20.822 CLOSE EXPOSURE TO COVID-19 VIRUS: Primary | ICD-10-CM

## 2020-12-07 DIAGNOSIS — Z20.822 CLOSE EXPOSURE TO COVID-19 VIRUS: ICD-10-CM

## 2020-12-07 PROCEDURE — U0003 INFECTIOUS AGENT DETECTION BY NUCLEIC ACID (DNA OR RNA); SEVERE ACUTE RESPIRATORY SYNDROME CORONAVIRUS 2 (SARS-COV-2) (CORONAVIRUS DISEASE [COVID-19]), AMPLIFIED PROBE TECHNIQUE, MAKING USE OF HIGH THROUGHPUT TECHNOLOGIES AS DESCRIBED BY CMS-2020-01-R: HCPCS | Performed by: PHYSICIAN ASSISTANT

## 2020-12-08 LAB — SARS-COV-2 RNA SPEC QL NAA+PROBE: NOT DETECTED

## 2021-01-06 ENCOUNTER — OFFICE VISIT (OUTPATIENT)
Dept: FAMILY MEDICINE CLINIC | Facility: CLINIC | Age: 61
End: 2021-01-06
Payer: COMMERCIAL

## 2021-01-06 VITALS
HEIGHT: 57 IN | DIASTOLIC BLOOD PRESSURE: 86 MMHG | BODY MASS INDEX: 40.99 KG/M2 | HEART RATE: 118 BPM | OXYGEN SATURATION: 98 % | SYSTOLIC BLOOD PRESSURE: 124 MMHG | WEIGHT: 190 LBS | TEMPERATURE: 97.7 F

## 2021-01-06 DIAGNOSIS — Z86.69 HX OF MIGRAINE HEADACHES: ICD-10-CM

## 2021-01-06 DIAGNOSIS — Z12.4 SCREENING FOR CERVICAL CANCER: ICD-10-CM

## 2021-01-06 DIAGNOSIS — L30.9 HAND DERMATITIS: ICD-10-CM

## 2021-01-06 DIAGNOSIS — Z12.31 ENCOUNTER FOR SCREENING MAMMOGRAM FOR BREAST CANCER: ICD-10-CM

## 2021-01-06 DIAGNOSIS — I10 ESSENTIAL HYPERTENSION: ICD-10-CM

## 2021-01-06 DIAGNOSIS — Z00.00 ANNUAL PHYSICAL EXAM: Primary | ICD-10-CM

## 2021-01-06 DIAGNOSIS — E78.5 HYPERLIPIDEMIA, UNSPECIFIED HYPERLIPIDEMIA TYPE: ICD-10-CM

## 2021-01-06 PROCEDURE — 3079F DIAST BP 80-89 MM HG: CPT | Performed by: FAMILY MEDICINE

## 2021-01-06 PROCEDURE — 99396 PREV VISIT EST AGE 40-64: CPT | Performed by: FAMILY MEDICINE

## 2021-01-06 PROCEDURE — 3074F SYST BP LT 130 MM HG: CPT | Performed by: FAMILY MEDICINE

## 2021-01-06 PROCEDURE — 3725F SCREEN DEPRESSION PERFORMED: CPT | Performed by: FAMILY MEDICINE

## 2021-01-06 PROCEDURE — 3008F BODY MASS INDEX DOCD: CPT | Performed by: FAMILY MEDICINE

## 2021-01-06 PROCEDURE — 1036F TOBACCO NON-USER: CPT | Performed by: FAMILY MEDICINE

## 2021-01-06 RX ORDER — DULOXETIN HYDROCHLORIDE 30 MG/1
CAPSULE, DELAYED RELEASE ORAL
COMMUNITY
Start: 2020-03-01

## 2021-01-06 RX ORDER — SUMATRIPTAN 100 MG/1
100 TABLET, FILM COATED ORAL ONCE AS NEEDED
Qty: 27 TABLET | Refills: 3 | Status: SHIPPED | OUTPATIENT
Start: 2021-01-06 | End: 2021-06-03

## 2021-01-06 RX ORDER — TRIAMTERENE AND HYDROCHLOROTHIAZIDE 37.5; 25 MG/1; MG/1
1 TABLET ORAL DAILY
Qty: 90 TABLET | Refills: 3 | Status: SHIPPED | OUTPATIENT
Start: 2021-01-06 | End: 2021-04-06 | Stop reason: SDUPTHER

## 2021-01-06 NOTE — PATIENT INSTRUCTIONS

## 2021-01-06 NOTE — PROGRESS NOTES
[facility]    NAME: Rd Lim  AGE: 63 y o  SEX: female  : 1960     DATE: 2021     Assessment and Plan:     Problem List Items Addressed This Visit     None      Visit Diagnoses     Annual physical exam    -  Primary    Essential hypertension        Relevant Medications    triamterene-hydrochlorothiazide (MAXZIDE-25) 37 5-25 mg per tablet    Hx of migraine headaches        Relevant Medications    SUMAtriptan (IMITREX) 100 mg tablet    Encounter for screening mammogram for breast cancer        Relevant Orders    Mammo screening bilateral w 3d & cad    Screening for cervical cancer        Relevant Orders    Ambulatory referral to Gynecology    Hyperlipidemia, unspecified hyperlipidemia type        Relevant Orders    Lipid panel    Comprehensive metabolic panel    Hand dermatitis        Relevant Medications    halobetasol (ULTRAVATE) 0 05 % cream          Immunizations and preventive care screenings were discussed with patient today  Appropriate education was printed on patient's after visit summary  Counseling:  · Exercise: the importance of regular exercise/physical activity was discussed  Recommend exercise 3-5 times per week for at least 30 minutes  BMI Counseling: Body mass index is 41 12 kg/m²  The BMI is above normal  Nutrition recommendations include decreasing portion sizes and encouraging healthy choices of fruits and vegetables  Exercise recommendations include moderate physical activity 150 minutes/week  No pharmacotherapy was ordered  Return in about 1 year (around 2022)  Chief Complaint:     Chief Complaint   Patient presents with    Physical Exam      History of Present Illness:     Adult Annual Physical   Patient here for a comprehensive physical exam  The patient reports problems - neck pain, headaches, bilateral shoulder pain      Diet and Physical Activity  · Diet/Nutrition: well balanced diet, limited junk food and consuming 3-5 servings of fruits/vegetables daily  · Exercise: moderate cardiovascular exercise and 3-4 times a week on average  Depression Screening  PHQ-9 Depression Screening    PHQ-9:   Frequency of the following problems over the past two weeks:      Little interest or pleasure in doing things: 1 - several days  Feeling down, depressed, or hopeless: 0 - not at all  PHQ-2 Score: 1       General Health  · Sleep: sleeps well and gets 7-8 hours of sleep on average  · Hearing: normal - bilateral   · Vision: no vision problems and most recent eye exam <1 year ago  · Dental: regular dental visits, brushes teeth twice daily and flosses teeth occasionally  /GYN Health  · Patient is: postmenopausal  · Last menstrual period: 2011  · Contraceptive method: none  Review of Systems:     Review of Systems   Constitutional: Negative for activity change, chills, fatigue and fever  HENT: Negative for congestion, ear pain, sinus pressure and sore throat  Eyes: Negative for pain and visual disturbance  Respiratory: Negative for cough, chest tightness, shortness of breath and wheezing  Cardiovascular: Negative for chest pain, palpitations and leg swelling  Gastrointestinal: Negative for abdominal pain, blood in stool, constipation, diarrhea, nausea and vomiting  Endocrine: Negative for polydipsia and polyuria  Genitourinary: Negative for difficulty urinating, dysuria, frequency and urgency  Musculoskeletal: Positive for myalgias and neck pain  Negative for arthralgias and joint swelling  Skin: Negative for rash  Allergic/Immunologic: Positive for environmental allergies  Neurological: Negative for dizziness, weakness, numbness and headaches  Hematological: Negative for adenopathy  Does not bruise/bleed easily  Psychiatric/Behavioral: Negative for dysphoric mood  The patient is not nervous/anxious         Past Medical History:     Past Medical History:   Diagnosis Date    Allergic     Asthma     Fibromyalgia     GERD (gastroesophageal reflux disease)     Migraine     Polymyalgia rheumatica (HCC)       Past Surgical History:     Past Surgical History:   Procedure Laterality Date    ARTHROTOMY KNEE Right 06/20/2014    medial meniscectomy    CERVICAL FUSION  2013    vertebral, l4 l5 also cyst removal    REPLACEMENT TOTAL KNEE  10/2015      Social History:        Social History     Socioeconomic History    Marital status: /Civil Union     Spouse name: None    Number of children: None    Years of education: None    Highest education level: None   Occupational History    None   Social Needs    Financial resource strain: None    Food insecurity     Worry: None     Inability: None    Transportation needs     Medical: None     Non-medical: None   Tobacco Use    Smoking status: Never Smoker    Smokeless tobacco: Never Used   Substance and Sexual Activity    Alcohol use: Yes     Frequency: Monthly or less     Comment: occ    Drug use: No    Sexual activity: None   Lifestyle    Physical activity     Days per week: None     Minutes per session: None    Stress: None   Relationships    Social connections     Talks on phone: None     Gets together: None     Attends Nondenominational service: None     Active member of club or organization: None     Attends meetings of clubs or organizations: None     Relationship status: None    Intimate partner violence     Fear of current or ex partner: None     Emotionally abused: None     Physically abused: None     Forced sexual activity: None   Other Topics Concern    None   Social History Narrative    Always uses seatbelt    Daily caffeine    No guns in the home      Family History:     Family History   Problem Relation Age of Onset    Hypertension Mother     Prostate cancer Father       Current Medications:     Current Outpatient Medications   Medication Sig Dispense Refill    acetaminophen (TYLENOL) 325 mg tablet Take 325 mg by mouth      Cholecalciferol (VITAMIN D) 2000 units CAPS Take 1 capsule by mouth       diphenhydrAMINE (BENADRYL) 25 mg capsule Take 25 mg by mouth      DULERA 200-5 MCG/ACT inhaler Inhale 2 puffs as needed       DULoxetine (CYMBALTA) 30 mg delayed release capsule       famotidine (PEPCID) 40 MG tablet Take 40 mg by mouth daily  3    gabapentin (NEURONTIN) 300 mg capsule Take 300 mg by mouth 2 (two) times a day   3    guaiFENesin (ROBITUSSIN) 100 mg/5 mL syrup Take 1 tablet by mouth      levalbuterol (XOPENEX HFA) 45 mcg/act inhaler Inhale 2 puffs as needed       levalbuterol (XOPENEX) 1 25 mg/3 mL nebulizer solution Inhale 1 25 mg as needed       methocarbamol (ROBAXIN) 750 mg tablet Take 750 mg by mouth daily 1 and 1/2 tab daily      predniSONE 5 mg tablet Take 5 mg by mouth daily   3    SUMAtriptan (IMITREX) 100 mg tablet Take 1 tablet (100 mg total) by mouth once as needed for migraine 27 tablet 3    triamterene-hydrochlorothiazide (MAXZIDE-25) 37 5-25 mg per tablet Take 1 tablet by mouth daily 90 tablet 3    halobetasol (ULTRAVATE) 0 05 % cream Apply topically 2 (two) times a day 50 g 0    montelukast (SINGULAIR) 10 mg tablet Take 1 tablet by mouth daily       No current facility-administered medications for this visit  Allergies:      Allergies   Allergen Reactions    Fluticasone-Salmeterol Other (See Comments)     Other reaction(s): Shaking    Morphine      Other reaction(s): Hypotension    Pollen Extract     Simvastatin Myalgia    Statins Myalgia    Zetia [Ezetimibe]      Tiredness, nausea, body aches    Chlorhexidine Rash    Penicillins Rash    Sulfa Antibiotics Rash     Pt claims to not have allergy to sulfa drugs      Physical Exam:     /86 (BP Location: Left arm, Patient Position: Sitting, Cuff Size: Standard)   Pulse (!) 118   Temp 97 7 °F (36 5 °C)   Ht 4' 9" (1 448 m)   Wt 86 2 kg (190 lb)   SpO2 98% BMI 41 12 kg/m²     Physical Exam  Vitals signs and nursing note reviewed  Constitutional:       Appearance: She is well-developed  She is obese  HENT:      Head: Normocephalic and atraumatic  Right Ear: External ear normal       Left Ear: External ear normal    Eyes:      Pupils: Pupils are equal, round, and reactive to light  Neck:      Musculoskeletal: Normal range of motion and neck supple  Thyroid: No thyromegaly  Cardiovascular:      Rate and Rhythm: Normal rate and regular rhythm  Heart sounds: Normal heart sounds  No murmur  Pulmonary:      Effort: Pulmonary effort is normal  No respiratory distress  Breath sounds: Normal breath sounds  Abdominal:      General: Bowel sounds are normal  There is no distension  Palpations: Abdomen is soft  There is no mass  Musculoskeletal: Normal range of motion  Lymphadenopathy:      Cervical: No cervical adenopathy  Skin:     General: Skin is warm and dry  Findings: No erythema or rash  Comments: Mild hand dermatitis   Neurological:      Mental Status: She is alert and oriented to person, place, and time  Cranial Nerves: No cranial nerve deficit        Deep Tendon Reflexes: Reflexes normal    Psychiatric:         Behavior: Behavior normal           [facility]

## 2021-01-20 DIAGNOSIS — L30.9 HAND DERMATITIS: ICD-10-CM

## 2021-01-30 ENCOUNTER — IMMUNIZATIONS (OUTPATIENT)
Dept: FAMILY MEDICINE CLINIC | Facility: HOSPITAL | Age: 61
End: 2021-01-30

## 2021-01-30 DIAGNOSIS — Z23 ENCOUNTER FOR IMMUNIZATION: Primary | ICD-10-CM

## 2021-01-30 PROCEDURE — 0011A SARS-COV-2 / COVID-19 MRNA VACCINE (MODERNA) 100 MCG: CPT | Performed by: ANESTHESIOLOGY

## 2021-01-30 PROCEDURE — 91301 SARS-COV-2 / COVID-19 MRNA VACCINE (MODERNA) 100 MCG: CPT | Performed by: ANESTHESIOLOGY

## 2021-01-31 DIAGNOSIS — L30.9 HAND DERMATITIS: ICD-10-CM

## 2021-02-27 ENCOUNTER — IMMUNIZATIONS (OUTPATIENT)
Dept: FAMILY MEDICINE CLINIC | Facility: HOSPITAL | Age: 61
End: 2021-02-27

## 2021-02-27 DIAGNOSIS — Z23 ENCOUNTER FOR IMMUNIZATION: Primary | ICD-10-CM

## 2021-02-27 PROCEDURE — 91301 SARS-COV-2 / COVID-19 MRNA VACCINE (MODERNA) 100 MCG: CPT

## 2021-02-27 PROCEDURE — 0012A SARS-COV-2 / COVID-19 MRNA VACCINE (MODERNA) 100 MCG: CPT

## 2021-03-11 ENCOUNTER — VBI (OUTPATIENT)
Dept: ADMINISTRATIVE | Facility: OTHER | Age: 61
End: 2021-03-11

## 2021-04-06 DIAGNOSIS — I10 ESSENTIAL HYPERTENSION: ICD-10-CM

## 2021-04-06 RX ORDER — TRIAMTERENE AND HYDROCHLOROTHIAZIDE 37.5; 25 MG/1; MG/1
1 TABLET ORAL DAILY
Qty: 90 TABLET | Refills: 3 | Status: SHIPPED | OUTPATIENT
Start: 2021-04-06 | End: 2021-11-09

## 2021-04-20 ENCOUNTER — VBI (OUTPATIENT)
Dept: ADMINISTRATIVE | Facility: OTHER | Age: 61
End: 2021-04-20

## 2021-05-07 ENCOUNTER — OFFICE VISIT (OUTPATIENT)
Dept: URGENT CARE | Facility: MEDICAL CENTER | Age: 61
End: 2021-05-07
Payer: COMMERCIAL

## 2021-05-07 VITALS
SYSTOLIC BLOOD PRESSURE: 131 MMHG | WEIGHT: 180 LBS | TEMPERATURE: 98.7 F | HEIGHT: 59 IN | RESPIRATION RATE: 20 BRPM | DIASTOLIC BLOOD PRESSURE: 68 MMHG | HEART RATE: 109 BPM | OXYGEN SATURATION: 96 % | BODY MASS INDEX: 36.29 KG/M2

## 2021-05-07 DIAGNOSIS — H11.32 SUBCONJUNCTIVAL HEMORRHAGE OF LEFT EYE: Primary | ICD-10-CM

## 2021-05-07 PROCEDURE — 99213 OFFICE O/P EST LOW 20 MIN: CPT | Performed by: PHYSICIAN ASSISTANT

## 2021-05-07 RX ORDER — TOPIRAMATE 25 MG/1
TABLET ORAL
COMMUNITY
Start: 2021-04-28 | End: 2022-01-12

## 2021-05-08 NOTE — PATIENT INSTRUCTIONS
Patient was educated on subconjunctival hemorrhage  Subconjunctival Hemorrhage   WHAT YOU NEED TO KNOW:   A subconjunctival hemorrhage is when blood collects under the conjunctiva in your eye  The conjunctiva is the clear lining that covers the white part of your eye  The blood comes from broken blood vessels under the conjunctiva  DISCHARGE INSTRUCTIONS:   Care for your eye:   · Cold or warm compress:  Use a cold pack during the first 24 hours  Ask how often to apply it and for how long each time  After the first 24 hours, apply a warm pack on your eye  Do this 3 times each day for about 10 to 15 minutes each time  · Eyedrops: You may need artificial tears to keep your eye moist  Use the drops as directed  Follow up with your healthcare provider or eye specialist as directed:  Write down your questions so you remember to ask them during your visits  Contact your healthcare provider or eye specialist if:   · The redness in your eye has not gone away after 3 weeks  · You have another subconjunctival hemorrhage  · You have subconjunctival hemorrhages in both eyes  · You have questions or concerns about your condition or care  Return to the emergency department if:   · You have eye pain or sensitivity to light  · Your vision changes  · You have white or yellow discharge from your eye  © Copyright [address] Information is for End User's use only and may not be sold, redistributed or otherwise used for commercial purposes  All illustrations and images included in CareNotes® are the copyrighted property of A D A M , Inc  or [facility] Maribel Saez   The above information is an  only  It is not intended as medical advice for individual conditions or treatments  Talk to your doctor, nurse or pharmacist before following any medical regimen to see if it is safe and effective for you

## 2021-05-08 NOTE — PROGRESS NOTES
[facility]        NAME: Abundio Szymanski is a 63 y o  female  : 1960    MRN: [id number]  DATE: May 7, 2021  TIME: 9:14 PM    Assessment and Plan   Subconjunctival hemorrhage of left eye [H11 32]  1  Subconjunctival hemorrhage of left eye       Patient was told we can check for foreign  body but declined  Patient reports she was at a shooting range this morning but did wear safety eye wear  Patient does not wear contacts    Patient Instructions     Patient was educated on subconjunctival hemorrhage  Chief Complaint     Chief Complaint   Patient presents with   [facility] she noticed hedr L eye was blood shot a about 3 today  she denies any injury, sneezing, or coughing  She has a little ache in her l eye         History of Present Illness       Patient reports she noticed redness in left eye  Patient denies any blurred vision, headache, nausea, or vomiting  Patient denies any coughing or vomiting  Patient denies any injury or trauma to left eye  Patient reports no pain in left eye and that it does not feel like a foreign body in her left eye  Patient reports she has allergies but takes benedryl for this  Review of Systems   Review of Systems   Constitutional: Negative  Eyes: Positive for redness  Negative for photophobia, pain and visual disturbance  Respiratory: Negative  Cardiovascular: Negative  Neurological: Negative  Psychiatric/Behavioral: Negative            Current Medications       Current Outpatient Medications:     acetaminophen (TYLENOL) 325 mg tablet, Take 325 mg by mouth, Disp: , Rfl:     Cholecalciferol (VITAMIN D) 2000 units CAPS, Take 1 capsule by mouth , Disp: , Rfl:     diphenhydrAMINE (BENADRYL) 25 mg capsule, Take 25 mg by mouth, Disp: , Rfl:     DULERA 200-5 MCG/ACT inhaler, Inhale 2 puffs as needed , Disp: , Rfl:     DULoxetine (CYMBALTA) 30 mg delayed release capsule, , Disp: , Rfl:     gabapentin (NEURONTIN) 300 mg capsule, Take 300 mg by mouth 2 (two) times a day , Disp: , Rfl: 3    guaiFENesin (ROBITUSSIN) 100 mg/5 mL syrup, Take 1 tablet by mouth, Disp: , Rfl:     halobetasol (ULTRAVATE) 0 05 % cream, APPLY TOPICALLY TWICE DAILY, Disp: 50 g, Rfl: 0    levalbuterol (XOPENEX HFA) 45 mcg/act inhaler, Inhale 2 puffs as needed , Disp: , Rfl:     levalbuterol (XOPENEX) 1 25 mg/3 mL nebulizer solution, Inhale 1 25 mg as needed , Disp: , Rfl:     methocarbamol (ROBAXIN) 750 mg tablet, Take 750 mg by mouth daily 1 and 1/2 tab daily, Disp: , Rfl:     predniSONE 5 mg tablet, Take 5 mg by mouth daily , Disp: , Rfl: 3    SUMAtriptan (IMITREX) 100 mg tablet, Take 1 tablet (100 mg total) by mouth once as needed for migraine, Disp: 27 tablet, Rfl: 3    triamterene-hydrochlorothiazide (MAXZIDE-25) 37 5-25 mg per tablet, Take 1 tablet by mouth daily, Disp: 90 tablet, Rfl: 3    famotidine (PEPCID) 40 MG tablet, Take 40 mg by mouth daily, Disp: , Rfl: 3    montelukast (SINGULAIR) 10 mg tablet, Take 1 tablet by mouth daily, Disp: , Rfl:     topiramate (TOPAMAX) 25 mg tablet, , Disp: , Rfl:     Current Allergies     Allergies as of 05/07/2021 - Reviewed 05/07/2021   Allergen Reaction Noted    Chlorhexidine Rash     Morphine Other (See Comments) 05/06/2015    Fluticasone-salmeterol Other (See Comments) 05/06/2015    Pollen extract  05/11/2017    Pravastatin Other (See Comments) 09/15/2020    Simvastatin Myalgia     Statins Myalgia 05/11/2017    Zetia [ezetimibe]  09/12/2019    Penicillins Rash 05/11/2017    Sulfa antibiotics Rash 05/11/2017            The following portions of the patient's history were reviewed and updated as appropriate: allergies, current medications, past family history, past medical history, past social history, past surgical history and problem list      Past Medical History:   Diagnosis Date    Allergic     Asthma     Fibromyalgia     GERD (gastroesophageal reflux disease)     Migraine     Polymyalgia rheumatica (HCC)        Past Surgical History:   Procedure Laterality Date    ARTHROTOMY KNEE Right 06/20/2014    medial meniscectomy    CERVICAL FUSION  2013    vertebral, l4 l5 also cyst removal    REPLACEMENT TOTAL KNEE  10/2015       Family History   Problem Relation Age of Onset    Hypertension Mother     Prostate cancer Father          Medications have been verified  Objective   /68   Pulse (!) 109   Temp 98 7 °F (37 1 °C)   Resp 20   Ht 4' 11" (1 499 m)   Wt 81 6 kg (180 lb)   SpO2 96%   BMI 36 36 kg/m²   No LMP recorded  Patient is postmenopausal        Physical Exam     Physical Exam  Constitutional:       Appearance: She is normal weight  HENT:      Head: Normocephalic  Eyes:      Extraocular Movements: Extraocular movements intact  Pupils: Pupils are equal, round, and reactive to light  Comments: Hemorrhaging in left  sclera  Cardiovascular:      Rate and Rhythm: Normal rate and regular rhythm  Heart sounds: Normal heart sounds  Pulmonary:      Breath sounds: Normal breath sounds  Neurological:      Mental Status: She is alert and oriented to person, place, and time     Psychiatric:         Mood and Affect: Mood normal          Behavior: Behavior normal

## 2021-06-03 DIAGNOSIS — Z86.69 HX OF MIGRAINE HEADACHES: ICD-10-CM

## 2021-06-03 RX ORDER — SUMATRIPTAN 100 MG/1
TABLET, FILM COATED ORAL
Qty: 27 TABLET | Refills: 3 | Status: SHIPPED | OUTPATIENT
Start: 2021-06-03 | End: 2021-10-31

## 2021-10-21 ENCOUNTER — OFFICE VISIT (OUTPATIENT)
Dept: FAMILY MEDICINE CLINIC | Facility: CLINIC | Age: 61
End: 2021-10-21
Payer: COMMERCIAL

## 2021-10-21 VITALS
TEMPERATURE: 97.6 F | BODY MASS INDEX: 38.3 KG/M2 | HEART RATE: 96 BPM | HEIGHT: 59 IN | DIASTOLIC BLOOD PRESSURE: 90 MMHG | WEIGHT: 190 LBS | OXYGEN SATURATION: 98 % | SYSTOLIC BLOOD PRESSURE: 150 MMHG

## 2021-10-21 DIAGNOSIS — J01.10 ACUTE NON-RECURRENT FRONTAL SINUSITIS: Primary | ICD-10-CM

## 2021-10-21 DIAGNOSIS — M35.3 POLYMYALGIA RHEUMATICA (HCC): ICD-10-CM

## 2021-10-21 DIAGNOSIS — J45.30 MILD PERSISTENT ASTHMA WITHOUT COMPLICATION: ICD-10-CM

## 2021-10-21 PROCEDURE — 3008F BODY MASS INDEX DOCD: CPT | Performed by: FAMILY MEDICINE

## 2021-10-21 PROCEDURE — 1036F TOBACCO NON-USER: CPT | Performed by: FAMILY MEDICINE

## 2021-10-21 PROCEDURE — 3725F SCREEN DEPRESSION PERFORMED: CPT | Performed by: FAMILY MEDICINE

## 2021-10-21 PROCEDURE — 99214 OFFICE O/P EST MOD 30 MIN: CPT | Performed by: FAMILY MEDICINE

## 2021-10-21 RX ORDER — DOXYCYCLINE HYCLATE 100 MG/1
CAPSULE ORAL
Qty: 11 CAPSULE | Refills: 0 | Status: SHIPPED | OUTPATIENT
Start: 2021-10-21 | End: 2021-10-31

## 2021-10-31 DIAGNOSIS — Z86.69 HX OF MIGRAINE HEADACHES: ICD-10-CM

## 2021-10-31 RX ORDER — SUMATRIPTAN 100 MG/1
TABLET, FILM COATED ORAL
Qty: 27 TABLET | Refills: 3 | Status: SHIPPED | OUTPATIENT
Start: 2021-10-31 | End: 2022-01-12 | Stop reason: SDUPTHER

## 2021-11-08 DIAGNOSIS — I10 ESSENTIAL HYPERTENSION: ICD-10-CM

## 2021-11-09 RX ORDER — TRIAMTERENE AND HYDROCHLOROTHIAZIDE 37.5; 25 MG/1; MG/1
TABLET ORAL
Qty: 90 TABLET | Refills: 0 | Status: SHIPPED | OUTPATIENT
Start: 2021-11-09 | End: 2022-01-12 | Stop reason: SDUPTHER

## 2021-12-07 ENCOUNTER — CLINICAL SUPPORT (OUTPATIENT)
Dept: FAMILY MEDICINE CLINIC | Facility: CLINIC | Age: 61
End: 2021-12-07
Payer: COMMERCIAL

## 2021-12-07 VITALS — TEMPERATURE: 97.6 F

## 2021-12-07 DIAGNOSIS — Z23 NEED FOR INFLUENZA VACCINATION: Primary | ICD-10-CM

## 2021-12-07 PROCEDURE — 90682 RIV4 VACC RECOMBINANT DNA IM: CPT | Performed by: FAMILY MEDICINE

## 2021-12-07 PROCEDURE — 90471 IMMUNIZATION ADMIN: CPT | Performed by: FAMILY MEDICINE

## 2022-01-05 ENCOUNTER — RA CDI HCC (OUTPATIENT)
Dept: OTHER | Facility: HOSPITAL | Age: 62
End: 2022-01-05

## 2022-01-05 NOTE — PROGRESS NOTES
[facility] [address]  coding opportunities          Number of diagnosis code(s) already on the problem list added to  fla     Chart Reviewed * (Number of) Inbasket suggestions sent to Provider: 1                  Patients insurance company: [employer] (Medicare Advantage and Commercial)           ) BMI>35   E66 01- Morbid (severe) obesity due to excess calories ([address] )   ----Per CMS/ICD 10 coding guidelines, to be used when BMI > 35 & <40 with one or more comorbidity ( GERD )    With the beginning of the new year, this is a reminder to address all [facility] [address]  (risk adjustment) codes for  as patient scores reset to zero LUIS   1) M31 6 Giant cell arteritis syndrome ([address] )  2) M35 3 Polymyalgia rheumatica (HCC)used  [address]  coding opportunities          Number of diagnosis code(s) already on the problem list added to  fla     Chart Reviewed * (Number of) Inbasket suggestions sent to Provider: 1            Number of suggestions used: 1      Number of suggestions NOT actually used: 3     Patients insurance company: [employer] ([employer])     Visit status: Patient arrived for their scheduled appointment     Provider never responded to [address]  coding request

## 2022-01-12 ENCOUNTER — OFFICE VISIT (OUTPATIENT)
Dept: FAMILY MEDICINE CLINIC | Facility: CLINIC | Age: 62
End: 2022-01-12
Payer: COMMERCIAL

## 2022-01-12 VITALS
RESPIRATION RATE: 12 BRPM | HEART RATE: 95 BPM | SYSTOLIC BLOOD PRESSURE: 118 MMHG | BODY MASS INDEX: 37.74 KG/M2 | DIASTOLIC BLOOD PRESSURE: 60 MMHG | OXYGEN SATURATION: 97 % | WEIGHT: 187.2 LBS | HEIGHT: 59 IN

## 2022-01-12 DIAGNOSIS — Z86.69 HX OF MIGRAINE HEADACHES: ICD-10-CM

## 2022-01-12 DIAGNOSIS — Z12.4 SCREENING FOR CERVICAL CANCER: ICD-10-CM

## 2022-01-12 DIAGNOSIS — J45.30 MILD PERSISTENT ASTHMA WITHOUT COMPLICATION: ICD-10-CM

## 2022-01-12 DIAGNOSIS — Z00.00 ANNUAL PHYSICAL EXAM: Primary | ICD-10-CM

## 2022-01-12 DIAGNOSIS — Z12.31 ENCOUNTER FOR SCREENING MAMMOGRAM FOR MALIGNANT NEOPLASM OF BREAST: ICD-10-CM

## 2022-01-12 DIAGNOSIS — L30.9 HAND DERMATITIS: ICD-10-CM

## 2022-01-12 DIAGNOSIS — I10 ESSENTIAL HYPERTENSION: ICD-10-CM

## 2022-01-12 DIAGNOSIS — M35.3 POLYMYALGIA RHEUMATICA (HCC): ICD-10-CM

## 2022-01-12 DIAGNOSIS — Z23 NEED FOR 23-POLYVALENT PNEUMOCOCCAL POLYSACCHARIDE VACCINE: ICD-10-CM

## 2022-01-12 PROCEDURE — 99396 PREV VISIT EST AGE 40-64: CPT | Performed by: INTERNAL MEDICINE

## 2022-01-12 PROCEDURE — 3008F BODY MASS INDEX DOCD: CPT | Performed by: INTERNAL MEDICINE

## 2022-01-12 PROCEDURE — 1036F TOBACCO NON-USER: CPT | Performed by: INTERNAL MEDICINE

## 2022-01-12 PROCEDURE — 90471 IMMUNIZATION ADMIN: CPT

## 2022-01-12 PROCEDURE — 90732 PPSV23 VACC 2 YRS+ SUBQ/IM: CPT

## 2022-01-12 RX ORDER — SUMATRIPTAN 100 MG/1
100 TABLET, FILM COATED ORAL ONCE AS NEEDED
Qty: 27 TABLET | Refills: 3 | Status: SHIPPED | OUTPATIENT
Start: 2022-01-12

## 2022-01-12 RX ORDER — TRIAMTERENE AND HYDROCHLOROTHIAZIDE 37.5; 25 MG/1; MG/1
1 TABLET ORAL DAILY
Qty: 90 TABLET | Refills: 0 | Status: SHIPPED | OUTPATIENT
Start: 2022-01-12 | End: 2022-02-25

## 2022-01-12 NOTE — PROGRESS NOTES
Assessment/Plan:    No problem-specific Assessment & Plan notes found for this encounter  Diagnoses and all orders for this visit:    Annual physical exam    Encounter for screening mammogram for malignant neoplasm of breast  -     Mammo screening bilateral w 3d & cad; Future  -     Ambulatory Referral to Gynecology; Future    Screening for cervical cancer  -     Ambulatory Referral to Gynecology; Future    BMI 37 0-37 9, adult  -     TSH, 3rd generation with Free T4 reflex; Future  -     CBC and differential; Future  -     Lipid panel; Future  -     Vitamin D 25 hydroxy; Future  -     UA (URINE) with reflex to Scope  -     HEMOGLOBIN A1C W/ EAG ESTIMATION; Future    Need for 23-polyvalent pneumococcal polysaccharide vaccine  -     Pneumococcal Polysaccharide Vaccine 23-Valent =>1yo SQ IM    Polymyalgia rheumatica ([address] )  Comments:  Looks like very well controlled being on low-dose of prednisone  Mild persistent asthma without complication  Comments:  Controlled  Essential hypertension  Comments:  Very well controlled on current therapy  Orders:  -     triamterene-hydrochlorothiazide (MAXZIDE-25) 37 5-25 mg per tablet; Take 1 tablet by mouth daily    Hx of migraine headaches  Comments:  Continue current therapy with sumatriptan, she will follow-up with us to discuss options for prophylaxis  Orders:  -     SUMAtriptan (IMITREX) 100 mg tablet; Take 1 tablet (100 mg total) by mouth once as needed for migraine for up to 1 dose    Hand dermatitis  -     halobetasol (ULTRAVATE) 0 05 % cream; Apply 1 application topically 2 (two) times a day          Subjective:      Patient ID: Laney Traylor is a 57 y o  female  Patient came for annual checkup  No active complaints  She is up-to-date it on colonoscopy  She agreed for her Pneumovax  Orders for mammogram and OBGYN referral is given        The following portions of the patient's history were reviewed and updated as appropriate: allergies, current medications, past family history, past medical history, past social history, past surgical history, and problem list     Review of Systems   Constitutional: Negative for activity change, appetite change, chills, fatigue and fever  HENT: Negative for congestion, ear pain, rhinorrhea and sore throat  Respiratory: Negative for cough, shortness of breath and wheezing  Cardiovascular: Negative for chest pain, palpitations and leg swelling  Gastrointestinal: Negative for abdominal distention, abdominal pain, diarrhea, nausea and vomiting  Genitourinary: Negative for difficulty urinating, frequency and pelvic pain  Musculoskeletal: Negative for arthralgias, back pain and neck pain  Skin: Negative for rash  Neurological: Negative for dizziness, tremors, weakness, numbness and headaches           Objective:      /60 (BP Location: Left arm, Patient Position: Sitting, Cuff Size: Standard)   Pulse 95   Resp 12   Ht 4' 11" (1 499 m)   Wt 84 9 kg (187 lb 3 2 oz)   SpO2 97%   BMI 37 81 kg/m²     Allergies   Allergen Reactions    Chlorhexidine Rash    Morphine Other (See Comments)     Other reaction(s): Hypotension    Fluticasone-Salmeterol Other (See Comments)     Other reaction(s): Shaking    Pollen Extract     Pravastatin Other (See Comments)    Simvastatin Myalgia    Statins Myalgia    Zetia [Ezetimibe]      Tiredness, nausea, body aches    Penicillins Rash    Sulfa Antibiotics Rash     Pt claims to not have allergy to sulfa drugs          Current Outpatient Medications:     acetaminophen (TYLENOL) 325 mg tablet, Take 325 mg by mouth, Disp: , Rfl:     Cholecalciferol (VITAMIN D) 2000 units CAPS, Take 1 capsule by mouth , Disp: , Rfl:     diphenhydrAMINE (BENADRYL) 25 mg capsule, Take 25 mg by mouth, Disp: , Rfl:     DULERA 200-5 MCG/ACT inhaler, Inhale 2 puffs as needed , Disp: , Rfl:     DULoxetine (CYMBALTA) 30 mg delayed release capsule, , Disp: , Rfl:     famotidine (PEPCID) 40 MG tablet, Take 40 mg by mouth daily, Disp: , Rfl: 3    gabapentin (NEURONTIN) 300 mg capsule, Take 300 mg by mouth 2 (two) times a day , Disp: , Rfl: 3    guaiFENesin (ROBITUSSIN) 100 mg/5 mL syrup, Take 1 tablet by mouth, Disp: , Rfl:     halobetasol (ULTRAVATE) 0 05 % cream, Apply 1 application topically 2 (two) times a day, Disp: 50 g, Rfl: 0    levalbuterol (XOPENEX HFA) 45 mcg/act inhaler, Inhale 2 puffs as needed , Disp: , Rfl:     levalbuterol (XOPENEX) 1 25 mg/3 mL nebulizer solution, Inhale 1 25 mg as needed , Disp: , Rfl:     methocarbamol (ROBAXIN) 750 mg tablet, Take 750 mg by mouth daily 1 and 1/2 tab daily, Disp: , Rfl:     predniSONE 5 mg tablet, Take 5 mg by mouth daily , Disp: , Rfl: 3    SUMAtriptan (IMITREX) 100 mg tablet, Take 1 tablet (100 mg total) by mouth once as needed for migraine for up to 1 dose, Disp: 27 tablet, Rfl: 3    triamterene-hydrochlorothiazide (MAXZIDE-25) 37 5-25 mg per tablet, Take 1 tablet by mouth daily, Disp: 90 tablet, Rfl: 0     There are no Patient Instructions on file for this visit  Physical Exam  Constitutional:       General: She is not in acute distress  Appearance: Normal appearance  She is not ill-appearing  HENT:      Nose: No rhinorrhea  Cardiovascular:      Rate and Rhythm: Normal rate and regular rhythm  Heart sounds: No murmur heard  No friction rub  No gallop  Pulmonary:      Effort: No respiratory distress  Breath sounds: No wheezing or rhonchi  Chest:      Chest wall: No tenderness  Abdominal:      General: There is no distension  Palpations: There is no mass  Tenderness: There is no abdominal tenderness  There is no guarding or rebound  Hernia: No hernia is present  Musculoskeletal:         General: No swelling or tenderness  Lymphadenopathy:      Cervical: No cervical adenopathy  Skin:     Coloration: Skin is not jaundiced  Findings: No rash     Neurological:      Mental Status: She is alert and oriented to person, place, and time  Motor: No weakness        Gait: Gait normal    Psychiatric:         Mood and Affect: Mood normal          Behavior: Behavior normal

## 2022-02-04 NOTE — PROGRESS NOTES
Assessment/Plan:    Recommended monthly SBE, annual CBE and annual screening mammo  ASCCP guidelines reviewed and pap with cotesting noted to be up to date; this low risk patient was advised she meets criteria to d/c pap screening at age 71  Pap done today  DEXA script given with instruction to check ins co for coverage and colonoscopy noted to be up to date  Reviewed diet/activity recommendations Calcium 7000-2123 mg and Vit D 600-1000 IU daily  Discussed postmenopausal considerations and symptoms to report  Kegel exercises as instructed  RTO in one year for routine annual gyn exam or sooner PRN  Diagnoses and all orders for this visit:    Encounter for gynecological examination (general) (routine) without abnormal findings    Osteoporosis screening  -     DXA bone density spine hip and pelvis; Future    Menopause  -     DXA bone density spine hip and pelvis; Future        Subjective:      Patient ID: Eliot Berman is a 57 y o  female  This new patient presents for routine annual gyn exam   She denies acute gyn complaints  She denies  bleeding or spotting, VM sx, pelvic pain, breast concerns, abnormal discharge, bowel/bladder dysfunction, depression/anx  , not sexually active and is monogamous  Last pap- patient unsure  Mammography ordered  Osteoporosis screening not done to date  Colonoscopy up to date, 12/26/19, due again after 5 years  The following portions of the patient's history were reviewed and updated as appropriate: allergies, current medications, past family history, past medical history, past social history, past surgical history and problem list     Review of Systems   Constitutional: Negative  Respiratory: Negative  Cardiovascular: Negative  Gastrointestinal: Negative  Endocrine: Negative  Genitourinary: Negative for dyspareunia, dysuria, frequency, pelvic pain, urgency, vaginal bleeding, vaginal discharge and vaginal pain     Musculoskeletal: Negative  Skin: Negative  Neurological: Negative  Psychiatric/Behavioral: Negative  Objective:      /60   Pulse 95   Ht 4' 11" (1 499 m)   Wt 84 8 kg (187 lb)   BMI 37 77 kg/m²          Physical Exam  Vitals and nursing note reviewed  Exam conducted with a chaperone present  Constitutional:       Appearance: Normal appearance  She is well-developed  HENT:      Head: Normocephalic and atraumatic  Neck:      Thyroid: No thyroid mass or thyromegaly  Cardiovascular:      Rate and Rhythm: Normal rate and regular rhythm  Heart sounds: Normal heart sounds  Pulmonary:      Effort: Pulmonary effort is normal       Breath sounds: Normal breath sounds  Chest:   Breasts: Breasts are symmetrical       Right: No inverted nipple, mass, nipple discharge, skin change or tenderness  Left: No inverted nipple, mass, nipple discharge, skin change or tenderness  Abdominal:      General: Bowel sounds are normal       Palpations: Abdomen is soft  Tenderness: There is no abdominal tenderness  Hernia: There is no hernia in the left inguinal area or right inguinal area  Genitourinary:     General: Normal vulva  Exam position: Supine  Pubic Area: No rash  Labia:         Right: No rash, tenderness, lesion or injury  Left: No rash, tenderness, lesion or injury  Urethra: No prolapse, urethral pain, urethral swelling or urethral lesion  Vagina: Normal  No signs of injury and foreign body  No vaginal discharge, erythema, tenderness, bleeding, lesions or prolapsed vaginal walls  Cervix: No cervical motion tenderness, discharge, friability, lesion, erythema, cervical bleeding or eversion  Uterus: Not deviated, not enlarged, not fixed, not tender and no uterine prolapse  Adnexa:         Right: No mass, tenderness or fullness  Left: No mass, tenderness or fullness  Rectum: No external hemorrhoid        Comments: Urethra normal without lesions  No bladder tenderness  Exam limited by body habitus  Musculoskeletal:         General: Normal range of motion  Cervical back: Normal range of motion and neck supple  Lymphadenopathy:      Lower Body: No right inguinal adenopathy  No left inguinal adenopathy  Skin:     General: Skin is warm and dry  Neurological:      Mental Status: She is alert and oriented to person, place, and time  Psychiatric:         Speech: Speech normal          Behavior: Behavior normal  Behavior is cooperative

## 2022-02-07 ENCOUNTER — OFFICE VISIT (OUTPATIENT)
Dept: GYNECOLOGY | Facility: CLINIC | Age: 62
End: 2022-02-07
Payer: COMMERCIAL

## 2022-02-07 ENCOUNTER — HOSPITAL ENCOUNTER (OUTPATIENT)
Dept: MAMMOGRAPHY | Facility: MEDICAL CENTER | Age: 62
Discharge: HOME | End: 2022-02-07
Payer: COMMERCIAL

## 2022-02-07 VITALS
BODY MASS INDEX: 37.7 KG/M2 | DIASTOLIC BLOOD PRESSURE: 60 MMHG | HEART RATE: 95 BPM | HEIGHT: 59 IN | WEIGHT: 187 LBS | SYSTOLIC BLOOD PRESSURE: 118 MMHG

## 2022-02-07 VITALS — WEIGHT: 187 LBS | HEIGHT: 59 IN | BODY MASS INDEX: 37.7 KG/M2

## 2022-02-07 DIAGNOSIS — Z13.820 OSTEOPOROSIS SCREENING: ICD-10-CM

## 2022-02-07 DIAGNOSIS — Z01.419 ENCOUNTER FOR GYNECOLOGICAL EXAMINATION WITH PAPANICOLAOU SMEAR OF CERVIX: ICD-10-CM

## 2022-02-07 DIAGNOSIS — Z12.4 SCREENING FOR CERVICAL CANCER: ICD-10-CM

## 2022-02-07 DIAGNOSIS — Z12.31 ENCOUNTER FOR SCREENING MAMMOGRAM FOR MALIGNANT NEOPLASM OF BREAST: ICD-10-CM

## 2022-02-07 DIAGNOSIS — Z01.419 ENCOUNTER FOR GYNECOLOGICAL EXAMINATION (GENERAL) (ROUTINE) WITHOUT ABNORMAL FINDINGS: Primary | ICD-10-CM

## 2022-02-07 DIAGNOSIS — Z78.0 MENOPAUSE: ICD-10-CM

## 2022-02-07 PROCEDURE — G0476 HPV COMBO ASSAY CA SCREEN: HCPCS | Performed by: OBSTETRICS & GYNECOLOGY

## 2022-02-07 PROCEDURE — G0145 SCR C/V CYTO,THINLAYER,RESCR: HCPCS | Performed by: OBSTETRICS & GYNECOLOGY

## 2022-02-07 PROCEDURE — S0610 ANNUAL GYNECOLOGICAL EXAMINA: HCPCS | Performed by: OBSTETRICS & GYNECOLOGY

## 2022-02-07 PROCEDURE — 77063 BREAST TOMOSYNTHESIS BI: CPT

## 2022-02-07 PROCEDURE — 3008F BODY MASS INDEX DOCD: CPT | Performed by: INTERNAL MEDICINE

## 2022-02-07 PROCEDURE — 77067 SCR MAMMO BI INCL CAD: CPT

## 2022-02-10 LAB
HPV HR 12 DNA CVX QL NAA+PROBE: NEGATIVE
HPV16 DNA CVX QL NAA+PROBE: NEGATIVE
HPV18 DNA CVX QL NAA+PROBE: NEGATIVE

## 2022-02-15 LAB
LAB AP GYN PRIMARY INTERPRETATION: NORMAL
Lab: NORMAL

## 2022-02-23 ENCOUNTER — DOCUMENTATION (OUTPATIENT)
Dept: GYNECOLOGY | Facility: CLINIC | Age: 62
End: 2022-02-23

## 2022-02-25 DIAGNOSIS — L30.9 HAND DERMATITIS: ICD-10-CM

## 2022-02-25 DIAGNOSIS — I10 ESSENTIAL HYPERTENSION: ICD-10-CM

## 2022-02-25 RX ORDER — TRIAMTERENE AND HYDROCHLOROTHIAZIDE 37.5; 25 MG/1; MG/1
TABLET ORAL
Qty: 90 TABLET | Refills: 3 | Status: SHIPPED | OUTPATIENT
Start: 2022-02-25

## 2022-03-29 ENCOUNTER — TELEPHONE (OUTPATIENT)
Dept: FAMILY MEDICINE CLINIC | Facility: CLINIC | Age: 62
End: 2022-03-29

## 2022-03-29 NOTE — TELEPHONE ENCOUNTER
[facility] called requesting read-back of Dx on Rx for SUMAtriptan (IMITREX) 100 mg tablet for long-term treatment  Verbal read-back of "Hx of migraine headaches" given  Pharmacist noted  No further needs

## 2022-10-06 ENCOUNTER — APPOINTMENT (OUTPATIENT)
Dept: RADIOLOGY | Facility: MEDICAL CENTER | Age: 62
End: 2022-10-06
Payer: COMMERCIAL

## 2022-10-06 ENCOUNTER — OFFICE VISIT (OUTPATIENT)
Dept: FAMILY MEDICINE CLINIC | Facility: CLINIC | Age: 62
End: 2022-10-06
Payer: COMMERCIAL

## 2022-10-06 VITALS
DIASTOLIC BLOOD PRESSURE: 80 MMHG | HEART RATE: 89 BPM | WEIGHT: 178.8 LBS | HEIGHT: 59 IN | SYSTOLIC BLOOD PRESSURE: 120 MMHG | BODY MASS INDEX: 36.04 KG/M2 | OXYGEN SATURATION: 97 %

## 2022-10-06 DIAGNOSIS — M79.672 PAIN OF LEFT HEEL: ICD-10-CM

## 2022-10-06 DIAGNOSIS — T14.8XXA IMPACTED FRACTURE: ICD-10-CM

## 2022-10-06 DIAGNOSIS — M79.672 PAIN OF LEFT HEEL: Primary | ICD-10-CM

## 2022-10-06 DIAGNOSIS — Z23 NEED FOR INFLUENZA VACCINATION: ICD-10-CM

## 2022-10-06 DIAGNOSIS — L60.8 TOENAIL DEFORMITY: ICD-10-CM

## 2022-10-06 PROCEDURE — 90682 RIV4 VACC RECOMBINANT DNA IM: CPT

## 2022-10-06 PROCEDURE — 73650 X-RAY EXAM OF HEEL: CPT

## 2022-10-06 PROCEDURE — 99214 OFFICE O/P EST MOD 30 MIN: CPT | Performed by: FAMILY MEDICINE

## 2022-10-06 PROCEDURE — 90471 IMMUNIZATION ADMIN: CPT

## 2022-10-06 NOTE — PROGRESS NOTES
FAMILY PRACTICE OFFICE VISIT    NAME: Selina Echevarria    AGE: 57 y o  SEX: female  : 1960   MRN: [id number]    DATE: 10/6/2022  TIME: 3:54 PM    Assessment and Plan     1  Pain of left heel  Cannot r/o heel spur  Xray  Heel eulogio  Ice bath - 3x/day for 15-20 min    2  Toenail deformity  Refer to derm:  Linear hyperpigmentation - right foot - medial border of 2nd toenail; no personal h/o skin cancer; present X 1 year; no known trauma; cannot r/o melanoma    Patient Instructions   Xray heel  Heel eulogio   Ice bath  Podiatry if  not improving    Dermatolog for nail        Chief Complaint     Chief Complaint   Patient presents with    Follow-up     Follow up for discuss about getting a medication to help her with migraines        History of Present Illness   Selina Echevarria is a 57y o -year-old female who presents today for concerns over a fall - and injury to left heel and bruise on toe  Would like to be evaluated  Pt asked about migraines and preventative medicine  She has tried multiple meds in past - and other treatments thru neuro to include biofeedback, botox,  I discussed that pt should f/u with neuro (perhaps and 2nd opinion) for ongoing eval and possibly trial of new meds  Review of Systems   Review of Systems   Musculoskeletal:        Injury to left heel   And right foot toe pain  With bruise - and ongoing  2 5 weeks ago - pt tripped while wearing sandles  Landed on cemet floor  Did not land on head/face  Pain while wearing nonsupoprtive shoes or barefoot  Right foot toe injury - pt unsure when or how it happened  Remains bruised      Treatment - pt wearing shoes for support         Active Problem List     Patient Active Problem List   Diagnosis    Allergic rhinitis    Asthma    GERD (gastroesophageal reflux disease)    Headache    Lumbar degenerative disc disease    Menopausal symptom    Polymyalgia rheumatica (HCC)    Anxiety    [facility]    Iron deficiency anemia secondary to inadequate dietary iron intake    Malabsorption of iron         Past Medical History:  Past Medical History:   Diagnosis Date    Allergic     Asthma     Fibromyalgia     GERD (gastroesophageal reflux disease)     Migraine     Polymyalgia rheumatica (HCC)        Past Surgical History:  Past Surgical History:   Procedure Laterality Date    ARTHROTOMY KNEE Right 06/20/2014    medial meniscectomy    CERVICAL FUSION  2013    vertebral, l4 l5 also cyst removal    REPLACEMENT TOTAL KNEE  10/2015       Family History:  Family History   Problem Relation Age of Onset    Hypertension Mother     Prostate cancer Father     No Known Problems Sister     Cancer Maternal Grandmother         unknown    No Known Problems Maternal Grandfather     No Known Problems Paternal Grandmother     No Known Problems Paternal Grandfather     No Known Problems Maternal Aunt     Cancer Maternal Aunt         unknown    No Known Problems Paternal Aunt     Breast cancer Paternal Aunt        Social History:  Social History     Socioeconomic History    Marital status: /Civil Union     Spouse name: Not on file    Number of children: Not on file    Years of education: Not on file    Highest education level: Not on file   Occupational History    Not on file   Tobacco Use    Smoking status: Never Smoker    Smokeless tobacco: Never Used   Vaping Use    Vaping Use: Never used   Substance and Sexual Activity    Alcohol use: Yes     Comment: occ    Drug use: No    Sexual activity: Not on file   Other Topics Concern    Not on file   Social History Narrative    Always uses seatbelt    Daily caffeine    No guns in the home     Social Determinants of Health     Financial Resource Strain: Not on file   Food Insecurity: Not on file   Transportation Needs: Not on file   Physical Activity: Not on file   Stress: Not on file   Social Connections: Not on file   Intimate Partner Violence: Not on file   Housing Stability: Not on file       Objective     Vitals:    10/06/22 1500   BP: 120/80   Pulse: 89   SpO2: 97%     Wt Readings from Last 3 Encounters:   10/06/22 81 1 kg (178 lb 12 8 oz)   02/07/22 84 8 kg (187 lb)   02/07/22 84 8 kg (187 lb)       Physical Exam  Vitals and nursing note reviewed  Constitutional:       General: She is not in acute distress  Appearance: Normal appearance  She is not ill-appearing or toxic-appearing  Cardiovascular:      Rate and Rhythm: Normal rate and regular rhythm  Pulses: Normal pulses  Heart sounds: Normal heart sounds  No murmur heard  No gallop  Musculoskeletal:      Comments: Tenderness left heel - midline (calcaneus) with palpation and slighlty with squeezing  No other areas of tenderness in feet or ankles     Skin:     Comments: Linear hyperpigmentation - right foot - medial border of 2nd toenail; no personal h/o skin cancer; present X 1 year; no known trauma; cannot r/o melanoma     Neurological:      General: No focal deficit present  Mental Status: She is alert and oriented to person, place, and time  Psychiatric:         Mood and Affect: Mood normal          Behavior: Behavior normal          Thought Content: Thought content normal          Judgment: Judgment normal          Pertinent Laboratory/Diagnostic Studies:  No results found for: GLUCOSE, BUN, CREATININE, CALCIUM, NA, K, CO2, CL  No results found for: ALT, AST, GGT, ALKPHOS, BILITOT    No results found for: WBC, HGB, HCT, MCV, PLT    No results found for: TSH    No results found for: CHOL  No results found for: TRIG  No results found for: HDL  No results found for: [facility]  Lab Results   Component Value Date    HGBA1C 5 9 (H) 03/05/2022       Results for orders placed or performed in visit on 03/05/22   Hemoglobin A1C   Result Value Ref Range    Hemoglobin A1C 5 9        No orders of the defined types were placed in this encounter        ALLERGIES:  Allergies Allergen Reactions    Chlorhexidine Rash    Morphine Other (See Comments)     Other reaction(s): Hypotension    Fluticasone-Salmeterol Other (See Comments)     Other reaction(s): Shaking    Other Hives    Pollen Extract     Pravastatin Other (See Comments)    Simvastatin Myalgia    Statins Myalgia    Zetia [Ezetimibe]      Tiredness, nausea, body aches    Penicillins Rash    Sulfa Antibiotics Rash     Pt claims to not have allergy to sulfa drugs       Current Medications     Current Outpatient Medications   Medication Sig Dispense Refill    acetaminophen (TYLENOL) 325 mg tablet Take 325 mg by mouth      Cholecalciferol (VITAMIN D) 2000 units CAPS Take 1 capsule by mouth       diphenhydrAMINE (BENADRYL) 25 mg capsule Take 25 mg by mouth      DULERA 200-5 MCG/ACT inhaler Inhale 2 puffs as needed       DULoxetine (CYMBALTA) 30 mg delayed release capsule       famotidine (PEPCID) 40 MG tablet Take 40 mg by mouth daily  3    gabapentin (NEURONTIN) 300 mg capsule Take 300 mg by mouth 2 (two) times a day   3    guaiFENesin (ROBITUSSIN) 100 mg/5 mL syrup Take 1 tablet by mouth      halobetasol (ULTRAVATE) 0 05 % cream APPLY 1 APPLICATION TOPICALLY TWICE A DAY 50 g 13    levalbuterol (XOPENEX HFA) 45 mcg/act inhaler Inhale 2 puffs as needed       levalbuterol (XOPENEX) 1 25 mg/3 mL nebulizer solution Inhale 1 25 mg as needed       methocarbamol (ROBAXIN) 750 mg tablet Take 750 mg by mouth daily 1 and 1/2 tab daily      predniSONE 5 mg tablet Take 5 mg by mouth daily   3    SUMAtriptan (IMITREX) 100 mg tablet Take 1 tablet (100 mg total) by mouth once as needed for migraine for up to 1 dose 27 tablet 3    triamterene-hydrochlorothiazide (MAXZIDE-25) 37 5-25 mg per tablet TAKE 1 TABLET DAILY 90 tablet 3     No current facility-administered medications for this visit           Health Maintenance     Health Maintenance   Topic Date Due    HIV Screening  Never done    COVID-19 Vaccine (4 - Booster for Moderna series) 12/20/2021    BMI: Followup Plan  01/06/2022    Influenza Vaccine (1) 09/01/2022    Depression Screening  10/21/2022    Annual Physical  01/12/2023    Breast Cancer Screening: Mammogram  02/07/2023    BMI: Adult  10/06/2023    Colorectal Cancer Screening  12/26/2024    DTaP,Tdap,and Td Vaccines (2 - Td or Tdap) 09/22/2026    Pneumococcal Vaccine: Pediatrics (0 to 5 Years) and At-Risk Patients (6 to 59 Years) (3 - PPSV23 or PCV20) 01/12/2027    Cervical Cancer Screening  02/07/2027    Hepatitis C Screening  Completed    HIB Vaccine  Aged Out    Hepatitis B Vaccine  Aged Out    IPV Vaccine  Aged Out    Hepatitis A Vaccine  Aged Out    Meningococcal ACWY Vaccine  Aged Out    HPV Vaccine  Aged Out     Immunization History   Administered Date(s) Administered    COVID-19 MODERNA VACC 0 5 ML IM 01/30/2021, 02/27/2021, 08/20/2021    H1N1, All Formulations 12/11/2009    INFLUENZA 01/01/2013, 10/15/2020    Influenza Quadrivalent Preservative Free Pediatric IM 11/01/2016    Influenza, injectable, quadrivalent, preservative free 0 5 mL 10/15/2020    Influenza, recombinant, quadrivalent,injectable, preservative free 09/12/2019, 12/07/2021    Pneumococcal Conjugate 13-Valent 11/01/2015    Pneumococcal Polysaccharide PPV23 01/12/2022    Tdap 09/22/2016    Zoster 11/01/2015, 10/15/2020    Zoster Vaccine Recombinant 10/15/2020, 12/26/2020          Bailee Mendez

## 2022-10-07 ENCOUNTER — TELEPHONE (OUTPATIENT)
Dept: DERMATOLOGY | Facility: CLINIC | Age: 62
End: 2022-10-07

## 2022-10-07 ENCOUNTER — TELEPHONE (OUTPATIENT)
Dept: FAMILY MEDICINE CLINIC | Facility: CLINIC | Age: 62
End: 2022-10-07

## 2022-10-07 NOTE — TELEPHONE ENCOUNTER
[facility] radiology called to let you know that there were significant findings on Gabriella's heel x-ray that was done on Oct  6, 2022

## 2022-10-07 NOTE — TELEPHONE ENCOUNTER
Called pt to schedule apt via referral, N/A, lm to cb    Provider Note:  Linear hyperpigmentation - right foot - medial border of 2nd toenail; no personal h/o skin cancer; present X 1 year; no known trauma; cannot r/o melanoma

## 2022-10-08 NOTE — RESULT ENCOUNTER NOTE
JAKE MTZ  YOUR XRAY SHOWS A POSSIBLE IMPACT FRACTURE OF YOUR HEEL  ALONG WITH A SPUR  I WOULD RECOMMEND ORTHOPEDIC CONSULT  REFERRAL IN CHART  PLEASE CALL FOR APPOINTMENT  WOULD CONTINUE WITH ICE BATHS AND LIMITED HIGH IMPACT (SO WALKING ON SOFTER SURFACES WITH GOOD SHOE SUPPORT)  - THANKS  DR Elliot Ashraf

## 2022-10-10 ENCOUNTER — TELEPHONE (OUTPATIENT)
Dept: OBGYN CLINIC | Facility: HOSPITAL | Age: 62
End: 2022-10-10

## 2022-10-17 ENCOUNTER — OFFICE VISIT (OUTPATIENT)
Dept: PODIATRY | Facility: CLINIC | Age: 62
End: 2022-10-17
Payer: COMMERCIAL

## 2022-10-17 VITALS
HEIGHT: 59 IN | WEIGHT: 177.6 LBS | SYSTOLIC BLOOD PRESSURE: 121 MMHG | DIASTOLIC BLOOD PRESSURE: 80 MMHG | BODY MASS INDEX: 35.8 KG/M2 | HEART RATE: 90 BPM

## 2022-10-17 DIAGNOSIS — M76.72 PERONEAL TENDINITIS OF LEFT LOWER EXTREMITY: ICD-10-CM

## 2022-10-17 DIAGNOSIS — M79.672 PAIN OF LEFT HEEL: ICD-10-CM

## 2022-10-17 PROCEDURE — 99243 OFF/OP CNSLTJ NEW/EST LOW 30: CPT | Performed by: STUDENT IN AN ORGANIZED HEALTH CARE EDUCATION/TRAINING PROGRAM

## 2022-10-17 NOTE — PROGRESS NOTES
Assessment/Plan:    No problem-specific Assessment & Plan notes found for this encounter  Diagnoses and all orders for this visit:    Pain of left heel  -     Ambulatory Referral to Orthopedic Surgery    Peroneal tendinitis of left lower extremity  -     Ambulatory Referral to Orthopedic Surgery     Plan:     - - Diagnosis and treatments discussed with patient   - Left foot xrays reviewed and interpreted with patient: no acute osseous abnormalities noted  - Patient exhibits left foot peroneal tendonitis  I recommended conservative treatments such as immobilization of peroneal tendon complex, provided RICE protocol, home stretching and strengthening exercises  She will continue with sneakers for now  - OTC PO NSAIDs as needed for pain control    - Return in 8 weeks for follow up  - Addressed all questions and concerns  - Call if any questions  Subjective:      Patient ID: Navdeep Hector is a 57 y o  female  57 female presents with complaint of left foot pain  Patient reports she is had left lateral foot pain since few weeks now  She does not recall any trauma or injury to the foot  Patient reports her foot pain is localized on the lateral aspect usually with activities after standing and walking for prolonged time  Whenever she is wearing sneakers her pain is not as much as with compared barefoot  She reports aching type of pain  Most of her pain that she experiences is usually when she is barefoot inside the house  Denies any other complaints at this time  Denies nausea vomiting fever chills shortness of breath  The following portions of the patient's history were reviewed and updated as appropriate:   She  has a past medical history of Allergic, Asthma, Fibromyalgia, GERD (gastroesophageal reflux disease), Migraine, and Polymyalgia rheumatica ([address] )    She   Patient Active Problem List    Diagnosis Date Noted   • Iron deficiency anemia secondary to inadequate dietary iron intake 04/23/2019   • Malabsorption of iron 04/23/2019   • Giant cell arteritis syndrome ([facility] ) 12/17/2018   • Anxiety 08/15/2018   • GERD (gastroesophageal reflux disease) 06/24/2018   • Headache 09/13/2017   • Polymyalgia rheumatica ([facility] ) 06/08/2017   • Asthma 05/11/2017   • Menopausal symptom 05/11/2017   • Lumbar degenerative disc disease 05/09/2017   • Allergic rhinitis 05/06/2015     She  has a past surgical history that includes ARTHROTOMY KNEE (Right, 06/20/2014); Cervical fusion (2013); and Replacement total knee (10/2015)       Review of Systems   Constitutional: Negative for chills  Respiratory: Negative for shortness of breath  Gastrointestinal: Negative for vomiting  Musculoskeletal: Positive for arthralgias  Skin: Negative for color change  Neurological: Negative for dizziness  Psychiatric/Behavioral: Negative for agitation  Objective:      /80 (BP Location: Left arm, Patient Position: Sitting, Cuff Size: Standard)   Pulse 90   Ht 4' 11" (1 499 m)   Wt 80 6 kg (177 lb 9 6 oz)   BMI 35 87 kg/m²          Physical Exam  Vitals reviewed  Constitutional:       Appearance: She is obese  Cardiovascular:      Rate and Rhythm: Normal rate  Pulses: Normal pulses  Pulmonary:      Effort: Pulmonary effort is normal    Musculoskeletal:         General: Tenderness present  Left foot: Tenderness present  Comments: Pain with palpation noted on the lateral rearfoot along the peroneal tendon  Mild discomfort with foot eversion and plantar flexion  No obvious foot deformity noted  MSK and neurovascular status intact  Skin:     General: Skin is warm and dry  Neurological:      General: No focal deficit present  Mental Status: She is alert     Psychiatric:         Mood and Affect: Mood normal

## 2022-10-25 ENCOUNTER — VBI (OUTPATIENT)
Dept: ADMINISTRATIVE | Facility: OTHER | Age: 62
End: 2022-10-25

## 2022-12-12 ENCOUNTER — OFFICE VISIT (OUTPATIENT)
Dept: PODIATRY | Facility: CLINIC | Age: 62
End: 2022-12-12

## 2022-12-12 VITALS
HEIGHT: 59 IN | WEIGHT: 179 LBS | DIASTOLIC BLOOD PRESSURE: 83 MMHG | HEART RATE: 96 BPM | SYSTOLIC BLOOD PRESSURE: 129 MMHG | BODY MASS INDEX: 36.08 KG/M2

## 2022-12-12 DIAGNOSIS — S99.921A INJURY OF TOENAIL OF RIGHT FOOT, INITIAL ENCOUNTER: Primary | ICD-10-CM

## 2022-12-12 RX ORDER — LIDOCAINE HYDROCHLORIDE 10 MG/ML
1 INJECTION, SOLUTION INFILTRATION; PERINEURAL ONCE
Status: COMPLETED | OUTPATIENT
Start: 2022-12-12 | End: 2022-12-12

## 2022-12-12 RX ADMIN — LIDOCAINE HYDROCHLORIDE 1 ML: 10 INJECTION, SOLUTION INFILTRATION; PERINEURAL at 10:45

## 2022-12-13 NOTE — PROGRESS NOTES
Assessment/Plan:    No problem-specific Assessment & Plan notes found for this encounter  Diagnoses and all orders for this visit:    Injury of toenail of right foot, initial encounter  -     lidocaine (XYLOCAINE) 1 % injection 1 mL      Plan:     - Patient presents discoloration to the right 2nd digit toenail, I recommended total nail avulsion with close monitoring of new toenail  Total toenail avulsion procedure was performed as detailed below     - The procedure, anesthesia, complications and alternative care were explained in great detail  No warranties or guarantees were given as to the outcome of the procedure  Verbal consent was obtained from the patient  1cc of 1% lidocaine plain was injected in to the right 2nd digit following sterile alcohol prep  The toe was prepped in sterile fashion  A tourniquet was applied to the digit for hemostasis  Under sterile conditions the (total) nail plate was removed  The tourniquet was removed after verifying all the pathologic nail tissue was removed  A dry sterile dressing with antibiotic ointment was applied to the surgical site  Home care instructions were given to the patient including decreased activity, ice and OTC pain medication as needed for 3 days  Patient will also perform daily dressing changes until the surgical site is fully healed  Patient tolerated the procedure well and with no complications  - Monitor for infection: pus (thick yellow drainage), redness tracking up the foot, fever, nausea, vomiting, fever, chills  If any of these issues arise, call clinic immediately or go to urgent care or emergency room to see provider     - The patient will return in 10 days for follow-up  Subjective:      Patient ID: Leandro Tomlinson is a 60 y o  female  Patient presents for follow-up of left peroneal tendinitis as well as a new onset of discoloration second digit toenail on the right    Patient reports left foot pain has significantly be improved  She noted right second digit discoloration about a month or 2 ago  She does not remember any distinct trauma to the area  Denies discomfort  No other complaints at this time  The following portions of the patient's history were reviewed and updated as appropriate:   She  has a past medical history of Allergic, Asthma, Fibromyalgia, GERD (gastroesophageal reflux disease), Migraine, and Polymyalgia rheumatica ([facility] )  She   Patient Active Problem List    Diagnosis Date Noted   • Iron deficiency anemia secondary to inadequate dietary iron intake 04/23/2019   • Malabsorption of iron 04/23/2019   • Giant cell arteritis syndrome ([address] ) 12/17/2018   • Anxiety 08/15/2018   • GERD (gastroesophageal reflux disease) 06/24/2018   • Headache 09/13/2017   • Polymyalgia rheumatica ([facility] ) 06/08/2017   • Asthma 05/11/2017   • Menopausal symptom 05/11/2017   • Lumbar degenerative disc disease 05/09/2017   • Allergic rhinitis 05/06/2015     She  has a past surgical history that includes ARTHROTOMY KNEE (Right, 06/20/2014); Cervical fusion (2013); and Replacement total knee (10/2015)       Review of Systems   Constitutional: Negative for chills  Respiratory: Negative for shortness of breath  Gastrointestinal: Negative for diarrhea  Musculoskeletal: Negative for arthralgias  Skin: Positive for color change  Neurological: Negative for dizziness  Psychiatric/Behavioral: Negative for agitation  Objective:      /83 (BP Location: Right arm, Patient Position: Sitting, Cuff Size: Large)   Pulse 96   Ht 4' 11" (1 499 m)   Wt 81 2 kg (179 lb)   BMI 36 15 kg/m²          Physical Exam  Vitals reviewed  Cardiovascular:      Rate and Rhythm: Normal rate  Pulses: Normal pulses  Dorsalis pedis pulses are 2+ on the right side  Posterior tibial pulses are 2+ on the right side     Pulmonary:      Effort: Pulmonary effort is normal    Musculoskeletal:         General: Normal range of motion  Feet:      Comments: Right second digit brownish discoloration noted to the toenail on the medial nail margin  No pain with palpation  Neurological:      General: No focal deficit present  Mental Status: She is alert  Psychiatric:         Mood and Affect: Mood normal        Nail removal    Date/Time: 12/12/2022 9:07 PM  Performed by: Elvira Velásquez DPM  Authorized by: Elvira Velásquez DPM     Patient location:  ClinicUniversal Protocol:  Consent: Verbal consent obtained  Risks and benefits: risks, benefits and alternatives were discussed  Consent given by: patient  Patient understanding: patient states understanding of the procedure being performed  Patient identity confirmed: verbally with patient      Location:     Foot:  R second toe  Pre-procedure details:     Skin preparation:  Alcohol and Betadine  Anesthesia (see MAR for exact dosages): Anesthesia method:  Local infiltration    Local anesthetic:  Lidocaine 1% w/o epi (1cc)  Nail Removal:     Nail removed:  Complete  Post-procedure details:     Dressing:  4x4 sterile gauze, gauze roll and antibiotic ointment    Patient tolerance of procedure:   Tolerated well, no immediate complications

## 2023-01-05 ENCOUNTER — OFFICE VISIT (OUTPATIENT)
Dept: FAMILY MEDICINE CLINIC | Facility: CLINIC | Age: 63
End: 2023-01-05

## 2023-01-05 VITALS
SYSTOLIC BLOOD PRESSURE: 112 MMHG | OXYGEN SATURATION: 95 % | HEIGHT: 59 IN | TEMPERATURE: 99 F | BODY MASS INDEX: 36.29 KG/M2 | HEART RATE: 75 BPM | RESPIRATION RATE: 18 BRPM | DIASTOLIC BLOOD PRESSURE: 80 MMHG | WEIGHT: 180 LBS

## 2023-01-05 DIAGNOSIS — F41.9 ANXIETY: ICD-10-CM

## 2023-01-05 DIAGNOSIS — Z51.81 MEDICATION MONITORING ENCOUNTER: ICD-10-CM

## 2023-01-05 DIAGNOSIS — Z12.31 ENCOUNTER FOR SCREENING MAMMOGRAM FOR BREAST CANCER: Primary | ICD-10-CM

## 2023-01-05 DIAGNOSIS — N95.1 MENOPAUSAL SYMPTOM: ICD-10-CM

## 2023-01-05 DIAGNOSIS — R94.31 ABNORMAL ECG: ICD-10-CM

## 2023-01-05 DIAGNOSIS — M35.3 POLYMYALGIA RHEUMATICA (HCC): ICD-10-CM

## 2023-01-05 DIAGNOSIS — Z11.4 SCREENING FOR HIV (HUMAN IMMUNODEFICIENCY VIRUS): ICD-10-CM

## 2023-01-05 DIAGNOSIS — E78.2 MIXED HYPERLIPIDEMIA: ICD-10-CM

## 2023-01-05 DIAGNOSIS — Z86.69 HX OF MIGRAINE HEADACHES: ICD-10-CM

## 2023-01-05 RX ORDER — SUMATRIPTAN 100 MG/1
TABLET, FILM COATED ORAL
Qty: 6 TABLET | Refills: 0 | Status: SHIPPED | OUTPATIENT
Start: 2023-01-05

## 2023-01-05 NOTE — PROGRESS NOTES
FAMILY PRACTICE OFFICE VISIT    NAME: Piter Purdy    AGE: 60 y o  SEX: female  : 1960   MRN: [id number]    DATE: 2023  TIME: 10:12 AM    Assessment and Plan    1  Encounter for screening mammogram for breast cancer    - Mammo screening bilateral w 3d & cad; Future    2  Hx of migraine headaches  Pt brought along list of tried and failed meds for migraines in past  Given frequency of migraines - prompting abortive med with imitrex - up to 3x/week  Refer to neuro    List of meds tried include:  ASA  advil  Tylenol  Butalbital  norvasc  amerge  maxalt  zomig  topamax  Metoprolol  Propranolol  Lisinopril  Elavil  pamelor  Verapamil    - SUMAtriptan (IMITREX) 100 mg tablet; Take 1 tab po up to once daily prn migraine; do not exceed 1 tab in 24 hours  Dispense: 6 tablet; Refill: 0  - Ambulatory Referral to Neurology; Future    3  Polymyalgia rheumatica (HCC)  Currently symptoms require use of the following meds for control:  Prednisone  Robaxin - 1 5 to 2 tabs/day  And cymbalta  neurontin      4  Menopausal symptom      5  Anxiety  Controlled with cymbalta      6  Mixed hyperlipidemia  Fasting labs    - Lipid panel; Future    Can give prevnar 21 age 71 as pt had both pneumovax 21 and prevnar 15 already      Discussion today - regarding concern over med combinations (on my end) - in particular - combination of imitrex, robaxin and cymbalta which can increase risk for serotonin syndrome   This is a rather serious condition that can cause hyperthermia, tachycardia, fevers and mental status changes and possibly death    Also - concern over neurontin and cymbalta - can lead to increased sedation and respiratory depression        7  Abnormal ecg  NSR at 75 bpm  Normal axis  Flat t wave in lead I  And flipped twave in AVL  No st seg elevation/depression  No comparison tracing just a report from [facility] in 2020 noting t wave abnormality    NO QTc prolongation  Will order exercise stress test - pt is asymptomatic  To ED if any symptoms in the interim  Note - I asked ROWDY Noel to advise pt of need for stress test at end of visit  As I did not go back into room  So I called pt personally on her cell after visit at [phone] to explain need for stress test  And she did not have any further questions  She will schedule  Pt denies any symptoms          Checked ecg to make sure no preexisting QTc prolongation    There are no Patient Instructions on file for this visit  Referral to neuro:  Migraines prompting use of imitrex up to 3x/week  H/o multiple med trials and failures in past  Suspect pt may be candidate for one of the newer injectables  Would like to see pt decrease use of imitrex particularly in light of concerning med combo with robaxin and cymbalta        Chief Complaint     Chief Complaint   Patient presents with   • Follow-up     Med check        History of Present Illness   Lissette Acuna is a 60y o -year-old female who presents today for med review      Review of Systems   Review of Systems   Constitutional: Negative for unexpected weight change  Respiratory: Positive for apnea  Negative for cough, shortness of breath and wheezing  Pt currently controlled  Is exacerbated by very cold weather  Wearing a dental appliance  Sleeping well  Cardiovascular: Negative for chest pain and palpitations  Musculoskeletal:        Pt has PMR  Taking prednisone   robaxin 1 5 - 2 tabs/day   Neurological: Positive for headaches  On avg - taking imitrex 2-3x/week  Psychiatric/Behavioral: Negative for dysphoric mood, self-injury, sleep disturbance and suicidal ideas  The patient is not nervous/anxious  Taking cymbalta for anxiety - and has been taking for years without any side effects  Prior was taking paxil  cymbalta seems to be working better for patient  Anxiety very well controlled as per pt           Active Problem List     Patient Active Problem List Diagnosis   • Allergic rhinitis   • Asthma   • GERD (gastroesophageal reflux disease)   • Headache   • Lumbar degenerative disc disease   • Menopausal symptom   • Polymyalgia rheumatica (HCC)   • Anxiety   • Giant cell arteritis syndrome (HCC)   • Iron deficiency anemia secondary to inadequate dietary iron intake   • Malabsorption of iron         Past Medical History:  Past Medical History:   Diagnosis Date   • Allergic    • Asthma    • Fibromyalgia    • GERD (gastroesophageal reflux disease)    • Migraine    • Polymyalgia rheumatica (HCC)        Past Surgical History:  Past Surgical History:   Procedure Laterality Date   • ARTHROTOMY KNEE Right 06/20/2014    medial meniscectomy   • CERVICAL FUSION  2013    vertebral, l4 l5 also cyst removal   • REPLACEMENT TOTAL KNEE  10/2015       Family History:  Family History   Problem Relation Age of Onset   • Hypertension Mother    • Prostate cancer Father    • No Known Problems Sister    • Cancer Maternal Grandmother         unknown   • No Known Problems Maternal Grandfather    • No Known Problems Paternal Grandmother    • No Known Problems Paternal Grandfather    • No Known Problems Maternal Aunt    • Cancer Maternal Aunt         unknown   • No Known Problems Paternal Aunt    • Breast cancer Paternal Aunt        Social History:  Social History     Socioeconomic History   • Marital status: /Civil Union     Spouse name: Not on file   • Number of children: Not on file   • Years of education: Not on file   • Highest education level: Not on file   Occupational History   • Not on file   Tobacco Use   • Smoking status: Never   • Smokeless tobacco: Never   Vaping Use   • Vaping Use: Never used   Substance and Sexual Activity   • Alcohol use:  Yes     Alcohol/week: 1 0 - 2 0 standard drink     Types: 1 - 2 Glasses of wine per week   • Drug use: No   • Sexual activity: Not on file   Other Topics Concern   • Not on file   Social History Narrative    Always uses seatbelt Daily caffeine    No guns in the home     Social Determinants of Health     Financial Resource Strain: Not on file   Food Insecurity: Not on file   Transportation Needs: Not on file   Physical Activity: Not on file   Stress: Not on file   Social Connections: Not on file   Intimate Partner Violence: Not on file   Housing Stability: Not on file       Objective     Vitals:    01/05/23 1000   BP: 112/80   Pulse: 75   Resp: 18   Temp: 99 °F (37 2 °C)   SpO2: 95%     Wt Readings from Last 3 Encounters:   01/05/23 81 6 kg (180 lb)   12/12/22 81 2 kg (179 lb)   10/17/22 80 6 kg (177 lb 9 6 oz)       Physical Exam    Pertinent Laboratory/Diagnostic Studies:  No results found for: GLUCOSE, BUN, CREATININE, CALCIUM, NA, K, CO2, CL  No results found for: ALT, AST, GGT, ALKPHOS, BILITOT    No results found for: WBC, HGB, HCT, MCV, PLT    No results found for: TSH    No results found for: CHOL  No results found for: TRIG  No results found for: HDL  No results found for: [facility]  Lab Results   Component Value Date    HGBA1C 5 9 (H) 03/05/2022       Results for orders placed or performed in visit on 03/05/22   Hemoglobin A1C   Result Value Ref Range    Hemoglobin A1C 5 9        No orders of the defined types were placed in this encounter        ALLERGIES:  Allergies   Allergen Reactions   • Chlorhexidine Rash   • Morphine Other (See Comments)     Other reaction(s): Hypotension   • Fish Oil - Food Allergy Hives   • Fluticasone-Salmeterol Other (See Comments)     Other reaction(s): Shaking   • Other Hives   • Pollen Extract    • Pravastatin Other (See Comments)   • Simvastatin Myalgia   • Statins Myalgia   • Zetia [Ezetimibe]      Tiredness, nausea, body aches   • Penicillins Rash   • Sulfa Antibiotics Rash     Pt claims to not have allergy to sulfa drugs       Current Medications     Current Outpatient Medications   Medication Sig Dispense Refill   • acetaminophen (TYLENOL) 325 mg tablet Take 325 mg by mouth     • Cholecalciferol (VITAMIN D) 2000 units CAPS Take 1 capsule by mouth      • diphenhydrAMINE (BENADRYL) 25 mg capsule Take 25 mg by mouth     • DULERA 200-5 MCG/ACT inhaler Inhale 2 puffs as needed      • DULoxetine (CYMBALTA) 30 mg delayed release capsule      • famotidine (PEPCID) 40 MG tablet Take 40 mg by mouth daily  3   • gabapentin (NEURONTIN) 300 mg capsule Take 300 mg by mouth 2 (two) times a day   3   • guaiFENesin (ROBITUSSIN) 100 mg/5 mL syrup Take 1 tablet by mouth     • halobetasol (ULTRAVATE) 0 05 % cream APPLY 1 APPLICATION TOPICALLY TWICE A DAY 50 g 13   • levalbuterol (XOPENEX HFA) 45 mcg/act inhaler Inhale 2 puffs as needed      • levalbuterol (XOPENEX) 1 25 mg/3 mL nebulizer solution Inhale 1 25 mg as needed      • methocarbamol (ROBAXIN) 750 mg tablet Take 750 mg by mouth daily 1 and 1/2 tab daily     • predniSONE 5 mg tablet Take 5 mg by mouth daily   3   • SUMAtriptan (IMITREX) 100 mg tablet Take 1 tab po up to once daily prn migraine; do not exceed 1 tab in 24 hours 6 tablet 0   • triamterene-hydrochlorothiazide (MAXZIDE-25) 37 5-25 mg per tablet TAKE 1 TABLET DAILY 90 tablet 3     No current facility-administered medications for this visit           Health Maintenance     Health Maintenance   Topic Date Due   • Hepatitis B Vaccine (1 of 3 - 3-dose series) Never done   • HIV Screening  Never done   • COVID-19 Vaccine (4 - Booster for Moderna series) 10/15/2021   • BMI: Followup Plan  01/06/2022   • Annual Physical  01/12/2023   • Breast Cancer Screening: Mammogram  02/07/2023   • BMI: Adult  12/12/2023   • Depression Screening  01/05/2024   • Colorectal Cancer Screening  12/26/2024   • DTaP,Tdap,and Td Vaccines (2 - Td or Tdap) 09/22/2026   • Pneumococcal Vaccine: Pediatrics (0 to 5 Years) and At-Risk Patients (6 to 59 Years) (3 - PPSV23 if available, else PCV20) 01/12/2027   • Cervical Cancer Screening  02/07/2027   • Hepatitis C Screening  Completed   • Influenza Vaccine  Completed   • HIB Vaccine  Aged Out • IPV Vaccine  Aged Out   • Hepatitis A Vaccine  Aged Out   • Meningococcal ACWY Vaccine  Aged Out   • HPV Vaccine  Aged Out     Immunization History   Administered Date(s) Administered   • COVID-19 MODERNA VACC 0 5 ML IM 01/30/2021, 02/27/2021, 08/20/2021   • H1N1, All Formulations 12/11/2009   • INFLUENZA 01/01/2013, 10/15/2020, 10/06/2022   • Influenza Quadrivalent Preservative Free Pediatric IM 11/01/2016   • Influenza, injectable, quadrivalent, preservative free 0 5 mL 10/15/2020   • Influenza, recombinant, quadrivalent,injectable, preservative free 09/12/2019, 12/07/2021, 10/06/2022   • Pneumococcal Conjugate 13-Valent 11/01/2015   • Pneumococcal Polysaccharide PPV23 01/12/2022   • Tdap 09/22/2016   • Zoster 11/01/2015, 10/15/2020   • Zoster Vaccine Recombinant 10/15/2020, 12/26/2020     BMI Counseling: Body mass index is 36 36 kg/m²  The BMI is above normal  Nutrition recommendations include decreasing portion sizes, encouraging healthy choices of fruits and vegetables, decreasing fast food intake, consuming healthier snacks, limiting drinks that contain sugar, moderation in carbohydrate intake, increasing intake of lean protein, reducing intake of saturated and trans fat and reducing intake of cholesterol  Exercise recommendations include exercising 3-5 times per week  No pharmacotherapy was ordered  Rationale for BMI follow-up plan is due to patient being overweight or obese  Advise regular exercise  Doing some yoga daily    Depression Screening and Follow-up Plan: Patient was screened for depression during today's encounter  They screened negative with a PHQ-2 score of 0          Susan Jack DO

## 2023-01-05 NOTE — PATIENT INSTRUCTIONS

## 2023-01-06 ENCOUNTER — OFFICE VISIT (OUTPATIENT)
Dept: PODIATRY | Facility: CLINIC | Age: 63
End: 2023-01-06

## 2023-01-06 VITALS — HEIGHT: 59 IN | WEIGHT: 180 LBS | BODY MASS INDEX: 36.29 KG/M2

## 2023-01-06 DIAGNOSIS — M76.72 PERONEAL TENDINITIS OF LEFT LOWER EXTREMITY: ICD-10-CM

## 2023-01-06 DIAGNOSIS — S99.921D INJURY OF TOENAIL OF RIGHT FOOT, SUBSEQUENT ENCOUNTER: Primary | ICD-10-CM

## 2023-01-06 NOTE — PROGRESS NOTES
Assessment/Plan:    No problem-specific Assessment & Plan notes found for this encounter  Diagnoses and all orders for this visit:    Injury of toenail of right foot, subsequent encounter    Peroneal tendinitis of left lower extremity      Plan:     -  Patient was counseled and educated on the condition and the diagnosis  The diagnosis, treatment options and prognosis were discussed in detail    -Second digit toenail avulsion site is healed and stable  Recommended patient to continue monitoring any discoloration to the toenail if it does reoccur she is to call my office for an appointment   -Continue home stretching and strengthening exercises as well as supportive shoe gear for left peroneal tendinitis  Return as needed  Subjective:      Patient ID: Lissette Acuna is a 60 y o  female  59-year-old female presents for an evaluation status post right second digit toenail avulsion  Patient reports she is doing excellent without any pain  She does report having mild discomfort to the left peroneal tendinitis however she is keeping up with stretching and strengthening exercises as well as supportive shoe gear which helps  No other complaints at this time  The following portions of the patient's history were reviewed and updated as appropriate:   She  has a past medical history of Allergic, Asthma, Fibromyalgia, GERD (gastroesophageal reflux disease), Migraine, and Polymyalgia rheumatica ([facility] [address] )    She   Patient Active Problem List    Diagnosis Date Noted   • Migraine    • Iron deficiency anemia secondary to inadequate dietary iron intake 04/23/2019   • Malabsorption of iron 04/23/2019   • Giant cell arteritis syndrome ([address] ) 12/17/2018   • Anxiety 08/15/2018   • GERD (gastroesophageal reflux disease) 06/24/2018   • Headache 09/13/2017   • Polymyalgia rheumatica ([address] ) 06/08/2017   • Asthma 05/11/2017   • Menopausal symptom 05/11/2017   • Lumbar degenerative disc disease 05/09/2017   • Allergic rhinitis 05/06/2015     She  has a past surgical history that includes ARTHROTOMY KNEE (Right, 06/20/2014); Cervical fusion (2013); and Replacement total knee (10/2015)       Review of Systems   All other systems reviewed and are negative  Objective:      Ht 4' 11" (1 499 m)   Wt 81 6 kg (180 lb)   BMI 36 36 kg/m²          Physical Exam  Vitals reviewed  Feet:      Comments: Right second digit toenail avulsion site is stable and healed  No pain with palpation  No discoloration noted

## 2023-01-09 ENCOUNTER — TELEPHONE (OUTPATIENT)
Dept: FAMILY MEDICINE CLINIC | Facility: CLINIC | Age: 63
End: 2023-01-09

## 2023-01-09 DIAGNOSIS — Z86.69 HX OF MIGRAINE HEADACHES: ICD-10-CM

## 2023-01-09 LAB — EXTERNAL HIV SCREEN: NORMAL

## 2023-01-09 RX ORDER — SUMATRIPTAN 100 MG/1
TABLET, FILM COATED ORAL
Qty: 6 TABLET | Refills: 0 | OUTPATIENT
Start: 2023-01-09

## 2023-01-09 NOTE — TELEPHONE ENCOUNTER
[facility] called in letting us know that the Imitrex comes in a pack of 9  Qty of 6 was prescribed  Per TT with dr cisneros, the qty of 9 is fine   Called pharmacy back and gave details

## 2023-01-12 NOTE — RESULT ENCOUNTER NOTE
Hiv is negative. Cbc and cmp are normal  Your inflammatory markers are elevated (sed rate and C reactive protein)  Suspect related to underlying PMR.   Would discuss with rheumatology  Have a nice day  Dr Antoine Jean-Baptiste

## 2023-01-30 ENCOUNTER — HOSPITAL ENCOUNTER (OUTPATIENT)
Dept: NON INVASIVE DIAGNOSTICS | Facility: HOSPITAL | Age: 63
Discharge: HOME | End: 2023-01-30
Attending: FAMILY MEDICINE

## 2023-01-30 VITALS — WEIGHT: 180 LBS | BODY MASS INDEX: 36.29 KG/M2 | HEIGHT: 59 IN

## 2023-01-30 DIAGNOSIS — R94.31 ABNORMAL ECG: ICD-10-CM

## 2023-01-30 LAB
CHEST PAIN STATEMENT: NORMAL
MAX DIASTOLIC BP: 88 MMHG
MAX HEART RATE: 157 BPM
MAX HR PERCENT: 100 %
MAX HR: 157 BPM
MAX PREDICTED HEART RATE: 157 BPM
MAX. SYSTOLIC BP: 174 MMHG
PROTOCOL NAME: NORMAL
RATE PRESSURE PRODUCT: NORMAL
REASON FOR TERMINATION: NORMAL
SL CV STRESS RECOVERY BP: NORMAL MMHG
SL CV STRESS RECOVERY HR: 98 BPM
SL CV STRESS RECOVERY O2 SAT: 98 %
SL CV STRESS STAGE REACHED: 2
STRESS ANGINA INDEX: 0
STRESS BASELINE BP: NORMAL MMHG
STRESS BASELINE HR: 99 BPM
STRESS DUKE TREADMILL SCORE: 4
STRESS O2 SAT REST: 97 %
STRESS PEAK HR: 153 BPM
STRESS POST ESTIMATED WORKLOAD: 5.2 METS
STRESS POST EXERCISE DUR MIN: 3 MIN
STRESS POST EXERCISE DUR SEC: 30 SEC
STRESS POST O2 SAT PEAK: 96 %
STRESS POST PEAK BP: 170 MMHG
STRESS ST DEPRESSION: 0 MM
TARGET HR FORMULA: NORMAL
TEST INDICATION: NORMAL
TIME IN EXERCISE PHASE: NORMAL

## 2023-01-31 NOTE — RESULT ENCOUNTER NOTE
Nickolas bowden  Your stress test is normal!  It does indicate however, that you have some deconditioning - which means that I would like to see you begin exercising   Start slow - by doing about 10 min a day - and work up to about 30 min at a time - 5x/week of moderately brisk activity  Find different things to do to keep you interested in exercise  Recommend routine visit with me in a few months  Have a great day!   Dr Lio Adair

## 2023-03-13 DIAGNOSIS — Z86.69 HX OF MIGRAINE HEADACHES: ICD-10-CM

## 2023-03-13 DIAGNOSIS — L30.9 HAND DERMATITIS: ICD-10-CM

## 2023-03-13 RX ORDER — SUMATRIPTAN 100 MG/1
TABLET, FILM COATED ORAL
Qty: 27 TABLET | Refills: 7 | Status: SHIPPED | OUTPATIENT
Start: 2023-03-13

## 2023-05-04 ENCOUNTER — TELEPHONE (OUTPATIENT)
Dept: NEUROLOGY | Facility: CLINIC | Age: 63
End: 2023-05-04

## 2023-05-04 NOTE — TELEPHONE ENCOUNTER
[facility] offering pt earlier appt on 5/12 at 1 pm with Dr Maile Fatima in [facility]   Informed pt this appt may no longer be available when they call back

## 2023-05-04 NOTE — TELEPHONE ENCOUNTER
Hello,     Patient has called back and accepted 5/12/2023, 1 pm , [facility], She is now re-scheduled      Thank you,     Chey Fleming

## 2023-05-12 ENCOUNTER — CONSULT (OUTPATIENT)
Dept: NEUROLOGY | Facility: CLINIC | Age: 63
End: 2023-05-12

## 2023-05-12 VITALS
WEIGHT: 178.5 LBS | OXYGEN SATURATION: 97 % | DIASTOLIC BLOOD PRESSURE: 84 MMHG | SYSTOLIC BLOOD PRESSURE: 126 MMHG | TEMPERATURE: 98.2 F | HEIGHT: 59 IN | BODY MASS INDEX: 35.99 KG/M2 | HEART RATE: 94 BPM

## 2023-05-12 DIAGNOSIS — F41.9 ANXIETY DISORDER: ICD-10-CM

## 2023-05-12 DIAGNOSIS — G43.009 MIGRAINE WITHOUT AURA AND WITHOUT STATUS MIGRAINOSUS, NOT INTRACTABLE: Primary | ICD-10-CM

## 2023-05-12 DIAGNOSIS — E66.9 OBESITY (BMI 30-39.9): ICD-10-CM

## 2023-05-12 DIAGNOSIS — G47.33 OBSTRUCTIVE SLEEP APNEA: ICD-10-CM

## 2023-05-12 NOTE — PROGRESS NOTES
Review of Systems   Constitutional: Negative  Negative for appetite change and fever  HENT: Negative  Negative for hearing loss, tinnitus, trouble swallowing and voice change  Eyes: Negative  Negative for photophobia, pain and visual disturbance  Respiratory: Negative  Negative for shortness of breath  Cardiovascular: Negative  Negative for palpitations  Gastrointestinal: Positive for nausea  Negative for vomiting  Endocrine: Negative  Negative for cold intolerance  Genitourinary: Negative  Negative for dysuria, frequency and urgency  Musculoskeletal: Negative  Negative for gait problem, myalgias and neck pain  Skin: Negative  Negative for rash  Allergic/Immunologic: Negative  Neurological: Positive for light-headedness and headaches  Negative for dizziness, tremors, seizures, syncope, facial asymmetry, speech difficulty, weakness and numbness  Hematological: Negative  Does not bruise/bleed easily  Psychiatric/Behavioral: Negative  Negative for confusion, hallucinations and sleep disturbance  All other systems reviewed and are negative

## 2023-05-12 NOTE — PROGRESS NOTES
[address] [facility] Consult  PATIENT:  Rashid Child  MRN:  [id number]  :  1960  DATE OF SERVICE:  2023  REFERRED BY: Christine Pathak DO  PMD: Omayra Wesley DO    Assessment/Plan:     Rashid Child is a very pleasant 60 y o  female with a past medical history that includes GERD, asthma, GCA, migraines, polymyalgia rheumatica, anxiety referred here for evaluation of headache  Initial evaluation 2023     Ms Amelia Avelar reports a longstanding history of migraines since she was a teenager  She was previously evaluated by neurology, but has not been seen by 1 in the last 10 years  To date, she has tried multiple medications for migraine management without significant benefit  We discussed a variety of potential treatment options, but she was interested in trying Emgality monthly injections  From an abortive standpoint, sumatriptan works very well for her so I will have her continue with that  She did report a history of obstructive sleep apnea, but stated that she does not use a CPAP because she was unable to tolerate it  Currently she uses a mouthguard and nasal strips  I emphasized that if we have difficulty treating her headaches going forward I will have her see our sleep medicine team for discussion of treatment options regarding her sleep apnea  Migraine without aura and without status migrainosus, not intractable  -     Galcanezumab-gnlm 120 MG/ML SOAJ; Inject 120 mg under the skin every 30 (thirty) days Following the first month loading dose of 2 pens  -     Galcanezumab-gnlm 120 MG/ML SOAJ; Inject 240 mg under the skin once for 1 dose For just the first month loading dose, followed by 1 injection monthly on separate prescription  Anxiety disorder  Obesity (BMI 30-39  9)  Obstructive sleep apnea    Workup:  -MRI brain without contrast 2021: No acute findings  No white matter changes      Preventative:  - we discussed headache hygiene and lifestyle factors that may improve headaches  - Emgality monthly injection  - Currently on through other providers: Cymbalta (mood), Gabapentin (pain), Prednisone (polymyalgia rheumatica), Triamterene-HCTZ (menopause)  - Past/ failed/contraindicated: Amlodipine, Topamax, metoprolol, propanolol, lisinopril, Elavil, Pamelor, verapamil  - future options: CGRP med, botox    Acute:  - discussed not taking over-the-counter or prescription pain medications more than 3 days per week to prevent medication overuse/rebound headache  - Sumatriptan 50-100mg  - Currently on through other providers: None  - Past/ failed/contraindicated: Fioricet, Amerge, Maxalt, Zomig, avoid steroids as she is already on daily steroids  - future options:  Triptan, prochlorperazine, Toradol IM or p o , could consider trial of 5 days of Depakote 500 mg nightly, andrew Wellington nurtec  Patient instructions   Headache Calendar  Please maintain a headache calendar  Consider using phone applications such as Migraine Fox or [place] Migraine Tracker    Headache/migraine treatment:   Acute medications (for immediate treatment of a headache): It is ok to take ibuprofen, acetaminophen or naproxen (Advil, Tylenol,  Aleve, Excedrin) if they help your headaches you should limit these to No more than 2-3 times a week to avoid medication overuse/rebound headaches  For your more moderate to severe migraines take this medication early  Imitrex (Sumatriptan) 100mg tabs - take 1/2-1 tablet at the onset of headache  May repeat one time after 2 hours if pain has not resolved  (Max 2 a day and 10 a month)     Prescription preventive medications for headaches/migraines   (to take every day to help prevent headaches - not to take at the time of headache):  [x] Emgality/Galcanezumab - the 1st dose is 240 mg loading dose of 2 consecutive 120 mg injections    Thereafter, 120 mg injections every 30 days    If needed there is a coupon card for the copay at [facility]  com     READ INSTRUCTIONS that come with the medication  REFRIGERATE  Keep out of direct sunlight  Prior to administration, allow to come to room temperature for 30 minutes  Do not warm using a heat source (eg, microwave or hot water)  Do not shake  Administer in preferably abdomen (avoiding 2 inches around the navel), thigh, upper arm, or buttocks avoiding areas of skin that are tender, bruised, red or hard  Deliver entire contents of single-use prefilled pen or syringe  *Typically these types of medications take time until you see the benefit, although some may see improvement in days, often it may take weeks, especially if the medication is being titrated up to a beneficial level  Please contact us if there are any concerns or questions regarding the medication  Lifestyle Recommendations:  [x] SLEEP - Maintain a regular sleep schedule: Adults need at least 7-8 hours of uninterrupted a night  Maintain good sleep hygiene:  Going to bed and waking up at consistent times, avoiding excessive daytime naps, avoiding caffeinated beverages in the evening, avoid excessive stimulation in the evening and generally using bed primarily for sleeping  One hour before bedtime would recommend turning lights down lower, decreasing your activity (may read quietly, listen to music at a low volume)  When you get into bed, should eliminate all technology (no texting, emailing, playing with your phone, iPad or tablet in bed)  [x] HYDRATION - Maintain good hydration  Drink  2L of fluid a day (4 typical small water bottles)  [x] DIET - Maintain good nutrition  In particular don't skip meals and try and eat healthy balanced meals regularly  [x] TRIGGERS - Look for other triggers and avoid them: Limit caffeine to 1-2 cups a day or less  Avoid dietary triggers that you have noticed bring on your headaches (this could include aged cheese, peanuts, MSG, aspartame and nitrates)    [x] EXERCISE - physical exercise as we all know is good for you in many ways, and not only is good for your heart, but also is beneficial for your mental health, cognitive health and  chronic pain/headaches  I would encourage at the least 5 days of physical exercise weekly for at least 30 minutes  Education and Follow-up  [x] Please call with any questions or concerns  Of course if any new concerning symptoms go to the emergency department  [x] Follow up in 6 months  CC: We had the pleasure of evaluating Josephine Quinteros in neurological consultation today  Josephine Quinteros is a   right handed female who presents today for evaluation of headaches  History obtained from patient as well as available medical record review  History of Present Illness:   Current medical illnesses  or past medical history include GERD, asthma, GCA, migraines, polymyalgia rheumatica, anxiety    Pertinent history:  -Medicine clinic on 1/5/2023 with reports of history of migraines  Multiple previous medications tried    Headaches started at what age? 14years old  How often do the headaches occur?   - as of 5/12/2023: 15/30 (of those, all are severe)  What time of the day do the headaches start? No particular time of day   How long do the headaches last? 4-6 hours or longer if severe enough  Are you ever headache free? Yes    Aura? without aura - previously had an aura of haze in peripheral vision, but no longer has this     Where is your headache located and pain quality? Left temporal/parietal; multiple pain types  What is the intensity of pain? Worst 10/10, Average: 6 5/10  Associated symptoms:   [x] Nausea  [x] Stiff or sore neck   [x] Photophobia     [x]Phonophobia  [x] Blurred vision   [x] Prefer quiet, dark room  [x] Light-headed or dizzy     [x] Hands or feet tingle or feel numb/paresthesias (history of back problems)     Things that make the headache worse?  No specific movements    Headache triggers: Sugar    Have you seen someone else for headaches or pain? Yes, neurology many years ago  Have you had trigger point injection performed and how often? Yes - with physiatry  Have you had Botox injection performed and how often? Yes, only 1 session   Have you had epidural injections or transforaminal injections performed? Yes  Are you current pregnant or planning on getting pregnant? N/A  Have you ever had any Brain imaging? yes MRI    Last eye exam: 1 5 years ago - normal, dilated exam  Wears glasses  No glaucoma  What medications do you take or have you taken for your headaches? ABORTIVE:    OTC medications: Tylenol (TID), Ibuprofen (daily) - for polymyalgia rheumatica  Prescription: Imitrex (takes 50mg - about 100% relief in 1-2 hours)    Past/ failed/contraindicated:  OTC medications: Aspirin  Prescription: Fioricet, Amerge, Maxalt, Zomig    PREVENTIVE:   Cymbalta (mood), Gabapentin (pain), Prednisone (polymyalgia rheumatica), Triamterene-HCTZ (menopause)    Past/ failed/contraindicated:  Amlodipine, Topamax, metoprolol, propanolol, lisinopril, Elavil, Pamelor, verapamil    LIFESTYLE  Sleep   - averages: about 7-8 hours per night  Problems falling asleep?:   Yes, sometimes  Problems staying asleep?:  No  - History of snoring  - History of prior sleep study (home study) about 2 years ago that showed mild JESSICA - wears mouthpiece and nasal strips   Did not tolerate CPAP    Physical activity: Stretching daily    Water: about 64oz or more per day  Caffeine: 1 cup of coffee per day    Mood:   History of anxiety  - PCP managing    The following portions of the patient's history were reviewed and updated as appropriate: allergies, current medications, past family history, past medical history, past social history, past surgical history and problem list     Pertinent family history:  Family history of headaches:  migraine headaches in mother, sister and brother  Any family history of aneurysms - No    Pertinent social history:  Work: Dental hygienist  Education: Masters  Lives with     Illicit Drugs: denies  Alcohol/tobacco: Denies tobacco use, alcohol intake: social drinker    Past Medical History:     Past Medical History:   Diagnosis Date   • Allergic    • Asthma    • Fibromyalgia    • GERD (gastroesophageal reflux disease)    • Migraine    • Polymyalgia rheumatica ([facility] )        Patient Active Problem List   Diagnosis   • Allergic rhinitis   • Asthma   • GERD (gastroesophageal reflux disease)   • Headache   • Lumbar degenerative disc disease   • Menopausal symptom   • Polymyalgia rheumatica ([facility] )   • Anxiety   • Giant cell arteritis syndrome (HCC)   • Iron deficiency anemia secondary to inadequate dietary iron intake   • Malabsorption of iron   • Migraine       Medications:      Current Outpatient Medications   Medication Sig Dispense Refill   • acetaminophen (TYLENOL) 325 mg tablet Take 325 mg by mouth     • Cholecalciferol (VITAMIN D) 2000 units CAPS Take 1 capsule by mouth      • diphenhydrAMINE (BENADRYL) 25 mg capsule Take 25 mg by mouth     • DULERA 200-5 MCG/ACT inhaler Inhale 2 puffs as needed      • DULoxetine (CYMBALTA) 30 mg delayed release capsule      • famotidine (PEPCID) 40 MG tablet Take 40 mg by mouth daily  3   • gabapentin (NEURONTIN) 300 mg capsule Take 300 mg by mouth 2 (two) times a day   3   • guaiFENesin (ROBITUSSIN) 100 mg/5 mL syrup Take 1 tablet by mouth     • halobetasol (ULTRAVATE) 0 05 % cream APPLY 1 APPLICATION TOPICALLY TWICE A DAY 50 g 13   • levalbuterol (XOPENEX HFA) 45 mcg/act inhaler Inhale 2 puffs as needed      • levalbuterol (XOPENEX) 1 25 mg/3 mL nebulizer solution Inhale 1 25 mg as needed      • methocarbamol (ROBAXIN) 750 mg tablet Take 750 mg by mouth daily 1 and 1/2 tab daily     • predniSONE 5 mg tablet Take 5 mg by mouth daily   3   • SUMAtriptan (IMITREX) 100 mg tablet TAKE 1 TABLET UP TO ONCE DAILY AS NEEDED FOR MIGRAINE   DO NOT EXCEED 1 TABLET IN 24 HOURS 27 tablet 7   • triamterene-hydrochlorothiazide (MAXZIDE-25) 37 5-25 mg per tablet TAKE 1 TABLET DAILY 90 tablet 0     No current facility-administered medications for this visit  Allergies: Allergies   Allergen Reactions   • Chlorhexidine Rash   • Morphine Other (See Comments)     Other reaction(s): Hypotension   • Fish Oil - Food Allergy Hives   • Fluticasone-Salmeterol Other (See Comments)     Other reaction(s): Shaking   • Other Hives   • Pollen Extract    • Pravastatin Other (See Comments)   • Simvastatin Myalgia   • Statins Myalgia   • Zetia [Ezetimibe]      Tiredness, nausea, body aches   • Penicillins Rash   • Sulfa Antibiotics Rash     Pt claims to not have allergy to sulfa drugs       Family History:     Family History   Problem Relation Age of Onset   • Hypertension Mother    • Prostate cancer Father    • No Known Problems Sister    • Cancer Maternal Grandmother         unknown   • No Known Problems Maternal Grandfather    • No Known Problems Paternal Grandmother    • No Known Problems Paternal Grandfather    • No Known Problems Maternal Aunt    • Cancer Maternal Aunt         unknown   • No Known Problems Paternal Aunt    • Breast cancer Paternal Aunt        Social History:       Social History     Socioeconomic History   • Marital status: /Civil Union     Spouse name: Not on file   • Number of children: Not on file   • Years of education: Not on file   • Highest education level: Not on file   Occupational History   • Not on file   Tobacco Use   • Smoking status: Never   • Smokeless tobacco: Never   Vaping Use   • Vaping Use: Never used   Substance and Sexual Activity   • Alcohol use:  Yes     Alcohol/week: 1 0 - 2 0 standard drink     Types: 1 - 2 Glasses of wine per week   • Drug use: No   • Sexual activity: Not on file   Other Topics Concern   • Not on file   Social History Narrative    Always uses seatbelt    Daily caffeine    No guns in the home     Social Determinants of Health     Financial "Resource Strain: Not on file   Food Insecurity: Not on file   Transportation Needs: Not on file   Physical Activity: Not on file   Stress: Not on file   Social Connections: Not on file   Intimate Partner Violence: Not on file   Housing Stability: Not on file         Objective:   Physical Exam:                                                               Vitals:            Constitutional:  /84 (BP Location: Left arm, Patient Position: Sitting, Cuff Size: Adult)   Pulse 94   Temp 98 2 °F (36 8 °C) (Temporal)   Ht 4' 11\" (1 499 m)   Wt 81 kg (178 lb 8 oz)   SpO2 97%   BMI 36 05 kg/m²   BP Readings from Last 3 Encounters:   05/12/23 126/84   01/05/23 112/80   12/12/22 129/83     Pulse Readings from Last 3 Encounters:   05/12/23 94   01/05/23 75   12/12/22 96         Well developed, well nourished, well groomed  No dysmorphic features  HEENT:  Normocephalic atraumatic  Oropharynx is clear and moist  No oral mucosal lesions  Chest:  Respirations regular and unlabored  Cardiovascular:  Distal extremities warm without palpable edema or tenderness, no observed significant swelling  Musculoskeletal:  (see below under neurologic exam for evaluation of motor function and gait)   Skin:  warm and dry, not diaphoretic  No apparent birthmarks or stigmata of neurocutaneous disease  Psychiatric:  Normal behavior and appropriate affect       Neurological Examination:     Mental status/cognitive function:   Orientated to time, place and person  Recent and remote memory intact  Attention span and concentration as well as fund of knowledge are appropriate for age  Normal language and spontaneous speech  Cranial Nerves:  II-visual fields full  Fundi poorly visualized due to pupillary constriction  III, IV, VI-Pupils were equal, round, and reactive to light and accomodation  Extraocular movements were full and conjugate without nystagmus    Conjugate gaze, normal smooth pursuits, normal saccades " V-facial sensation symmetric  VII-facial expression symmetric, intact forehead wrinkle, strong eye closure, symmetric smile    VIII-hearing grossly intact bilaterally   IX, X-palate elevation symmetric, no dysarthria  XI-shoulder shrug strength intact    XII-tongue protrusion midline  Motor Exam: symmetric bulk and tone throughout, no pronator drift  Power/strength 5/5 bilateral upper and lower extremities, no atrophy, fasciculations or abnormal movements noted  Sensory: grossly intact light touch in all extremities  Reflexes: brachioradialis 2+, biceps 2+, knee 2+, ankle 2+ bilaterally  No ankle clonus  Coordination: Finger nose finger intact bilaterally, no apparent dysmetria, ataxia or tremor noted  Gait: steady casual and tandem gait  Pertinent lab results: None     Pertinent Imaging:   -MRI brain without contrast 2/23/2021: No acute findings  No white matter changes  I have personally reviewed imaging and radiology read  Review of Systems:   Constitutional: Negative  Negative for appetite change and fever  HENT: Negative  Negative for hearing loss, tinnitus, trouble swallowing and voice change  Eyes: Negative  Negative for photophobia, pain and visual disturbance  Respiratory: Negative  Negative for shortness of breath  Cardiovascular: Negative  Negative for palpitations  Gastrointestinal: Positive for nausea  Negative for vomiting  Endocrine: Negative  Negative for cold intolerance  Genitourinary: Negative  Negative for dysuria, frequency and urgency  Musculoskeletal: Negative  Negative for gait problem, myalgias and neck pain  Skin: Negative  Negative for rash  Allergic/Immunologic: Negative  Neurological: Positive for light-headedness and headaches  Negative for dizziness, tremors, seizures, syncope, facial asymmetry, speech difficulty, weakness and numbness  Hematological: Negative  Does not bruise/bleed easily  Psychiatric/Behavioral: Negative  Negative for confusion, hallucinations and sleep disturbance  All other systems reviewed and are negative  I have spent 30 minutes with the patient today in which greater than 50% of this time was spent in counseling/coordination of care regarding Diagnostic results, Prognosis, Risks and benefits of tx options, Patient and family education, Impressions, Documenting in the medical record, Reviewing / ordering tests, medicine, procedures   and Obtaining or reviewing history    I also spent 15 minutes non face to face for this patient the same day       Activity Minutes   Precharting/reviewing 10   Patient care/counseling 30   Postcharting/care coordination 5       Author:  Claudina Bloch, DO 5/12/2023 1:04 PM

## 2023-05-12 NOTE — PATIENT INSTRUCTIONS
Headache Calendar  Please maintain a headache calendar  Consider using phone applications such as Migraine Fox or [place] Migraine Tracker    Headache/migraine treatment:   Acute medications (for immediate treatment of a headache): It is ok to take ibuprofen, acetaminophen or naproxen (Advil, Tylenol,  Aleve, Excedrin) if they help your headaches you should limit these to No more than 2-3 times a week to avoid medication overuse/rebound headaches  For your more moderate to severe migraines take this medication early  Imitrex (Sumatriptan) 100mg tabs - take 1/2-1 tablet at the onset of headache  May repeat one time after 2 hours if pain has not resolved  (Max 2 a day and 10 a month)     Prescription preventive medications for headaches/migraines   (to take every day to help prevent headaches - not to take at the time of headache):  [x] Emgality/Galcanezumab - the 1st dose is 240 mg loading dose of 2 consecutive 120 mg injections  Thereafter, 120 mg injections every 30 days    If needed there is a coupon card for the copay at [employer]  com     READ INSTRUCTIONS that come with the medication  REFRIGERATE  Keep out of direct sunlight  Prior to administration, allow to come to room temperature for 30 minutes  Do not warm using a heat source (eg, microwave or hot water)  Do not shake  Administer in preferably abdomen (avoiding 2 inches around the navel), thigh, upper arm, or buttocks avoiding areas of skin that are tender, bruised, red or hard  Deliver entire contents of single-use prefilled pen or syringe  *Typically these types of medications take time until you see the benefit, although some may see improvement in days, often it may take weeks, especially if the medication is being titrated up to a beneficial level  Please contact us if there are any concerns or questions regarding the medication       Lifestyle Recommendations:  [x] SLEEP - Maintain a regular sleep schedule: Adults need at least 7-8 hours of uninterrupted a night  Maintain good sleep hygiene:  Going to bed and waking up at consistent times, avoiding excessive daytime naps, avoiding caffeinated beverages in the evening, avoid excessive stimulation in the evening and generally using bed primarily for sleeping  One hour before bedtime would recommend turning lights down lower, decreasing your activity (may read quietly, listen to music at a low volume)  When you get into bed, should eliminate all technology (no texting, emailing, playing with your phone, iPad or tablet in bed)  [x] HYDRATION - Maintain good hydration  Drink  2L of fluid a day (4 typical small water bottles)  [x] DIET - Maintain good nutrition  In particular don't skip meals and try and eat healthy balanced meals regularly  [x] TRIGGERS - Look for other triggers and avoid them: Limit caffeine to 1-2 cups a day or less  Avoid dietary triggers that you have noticed bring on your headaches (this could include aged cheese, peanuts, MSG, aspartame and nitrates)  [x] EXERCISE - physical exercise as we all know is good for you in many ways, and not only is good for your heart, but also is beneficial for your mental health, cognitive health and  chronic pain/headaches  I would encourage at the least 5 days of physical exercise weekly for at least 30 minutes  Education and Follow-up  [x] Please call with any questions or concerns  Of course if any new concerning symptoms go to the emergency department    [x] Follow up in 6 months

## 2023-05-18 ENCOUNTER — TELEPHONE (OUTPATIENT)
Dept: NEUROLOGY | Facility: CLINIC | Age: 63
End: 2023-05-18

## 2023-05-18 NOTE — TELEPHONE ENCOUNTER
Received fax from [employer] requesting PA  PA initiated on [facility]    (Key: [id number])    Awaiting determination

## 2023-05-22 DIAGNOSIS — I10 ESSENTIAL HYPERTENSION: ICD-10-CM

## 2023-05-22 RX ORDER — TRIAMTERENE AND HYDROCHLOROTHIAZIDE 37.5; 25 MG/1; MG/1
TABLET ORAL
Qty: 90 TABLET | Refills: 3 | Status: SHIPPED | OUTPATIENT
Start: 2023-05-22

## 2023-05-22 NOTE — TELEPHONE ENCOUNTER
[employer] approved from 5/21/23-5/21/24    mychart message sent to pt    See mychart message from 5/19

## 2023-11-21 ENCOUNTER — OFFICE VISIT (OUTPATIENT)
Dept: NEUROLOGY | Facility: CLINIC | Age: 63
End: 2023-11-21
Payer: COMMERCIAL

## 2023-11-21 VITALS
WEIGHT: 178.2 LBS | HEIGHT: 59 IN | TEMPERATURE: 98 F | BODY MASS INDEX: 35.92 KG/M2 | HEART RATE: 91 BPM | OXYGEN SATURATION: 97 % | SYSTOLIC BLOOD PRESSURE: 128 MMHG | DIASTOLIC BLOOD PRESSURE: 90 MMHG

## 2023-11-21 DIAGNOSIS — G43.009 MIGRAINE WITHOUT AURA AND WITHOUT STATUS MIGRAINOSUS, NOT INTRACTABLE: Primary | ICD-10-CM

## 2023-11-21 DIAGNOSIS — E66.9 OBESITY (BMI 30-39.9): ICD-10-CM

## 2023-11-21 DIAGNOSIS — G47.33 OBSTRUCTIVE SLEEP APNEA: ICD-10-CM

## 2023-11-21 DIAGNOSIS — F41.9 ANXIETY DISORDER: ICD-10-CM

## 2023-11-21 PROCEDURE — 99214 OFFICE O/P EST MOD 30 MIN: CPT | Performed by: STUDENT IN AN ORGANIZED HEALTH CARE EDUCATION/TRAINING PROGRAM

## 2023-11-21 RX ORDER — IPRATROPIUM BROMIDE 17 UG/1
2 AEROSOL, METERED RESPIRATORY (INHALATION) EVERY 6 HOURS PRN
COMMUNITY
Start: 2023-08-22

## 2023-11-21 NOTE — PROGRESS NOTES
Review of Systems   Constitutional:  Negative for appetite change, fatigue and fever. HENT: Negative. Negative for hearing loss, tinnitus, trouble swallowing and voice change. Eyes: Negative. Negative for photophobia, pain and visual disturbance. Respiratory: Negative. Negative for shortness of breath. Cardiovascular: Negative. Negative for palpitations. Gastrointestinal: Negative. Negative for nausea and vomiting. Endocrine: Negative. Negative for cold intolerance. Genitourinary: Negative. Negative for dysuria, frequency and urgency. Musculoskeletal:  Negative for back pain, gait problem, myalgias and neck pain. Skin: Negative. Negative for rash. Allergic/Immunologic: Negative. Neurological:  Positive for headaches. Negative for dizziness, tremors, seizures, syncope, facial asymmetry, speech difficulty, weakness, light-headedness and numbness. Hematological: Negative. Does not bruise/bleed easily. Psychiatric/Behavioral: Negative. Negative for confusion, hallucinations and sleep disturbance. All other systems reviewed and are negative. Since your last visit are your headaches Improved    Any change to the headache type? Yes, less severe     What is your current headache frequency: 2-3 per week    Are you taking your current medications as prescribed?  yes    Do you have any side effects? no    How may days per week do you take an abortive medicine? 2-3/7

## 2023-11-21 NOTE — PATIENT INSTRUCTIONS
- Discuss increasing Cymbalta or Gabapentin to help with diffuse aches and pains    Headache Calendar  Please maintain a headache calendar  Consider using phone applications such as Migraine Fox or [place] Migraine Tracker     Headache/migraine treatment:   Acute medications (for immediate treatment of a headache): It is ok to take ibuprofen, acetaminophen or naproxen (Advil, Tylenol,  Aleve, Excedrin) if they help your headaches you should limit these to No more than 2-3 times a week to avoid medication overuse/rebound headaches. For your more moderate to severe migraines take this medication early  Imitrex (Sumatriptan) 100mg tabs - take 1/2-1 tablet at the onset of headache. May repeat one time after 2 hours if pain has not resolved. (Max 2 a day and 10 a month)      Prescription preventive medications for headaches/migraines   (to take every day to help prevent headaches - not to take at the time of headache):  Emgality/Galcanezumab - 120mg injection monthly     Lifestyle Recommendations:  [x] SLEEP - Maintain a regular sleep schedule: Adults need at least 7-8 hours of uninterrupted a night. Maintain good sleep hygiene:  Going to bed and waking up at consistent times, avoiding excessive daytime naps, avoiding caffeinated beverages in the evening, avoid excessive stimulation in the evening and generally using bed primarily for sleeping. One hour before bedtime would recommend turning lights down lower, decreasing your activity (may read quietly, listen to music at a low volume). When you get into bed, should eliminate all technology (no texting, emailing, playing with your phone, iPad or tablet in bed). [x] HYDRATION - Maintain good hydration. Drink  2L of fluid a day (4 typical small water bottles)  [x] DIET - Maintain good nutrition. In particular don't skip meals and try and eat healthy balanced meals regularly.   [x] TRIGGERS - Look for other triggers and avoid them: Limit caffeine to 1-2 cups a day or less. Avoid dietary triggers that you have noticed bring on your headaches (this could include aged cheese, peanuts, MSG, aspartame and nitrates). [x] EXERCISE - physical exercise as we all know is good for you in many ways, and not only is good for your heart, but also is beneficial for your mental health, cognitive health and  chronic pain/headaches. I would encourage at the least 5 days of physical exercise weekly for at least 30 minutes. Education and Follow-up  [x] Please call with any questions or concerns. Of course if any new concerning symptoms go to the emergency department.   [x] Follow up in 6 months

## 2023-11-21 NOTE — PROGRESS NOTES
[facility] Consult - Follow up   PATIENT:  Justin Dixon  MRN:  [id number]  :  1960  DATE OF SERVICE:  2023  REFERRED BY: No ref. provider found  PMD: Bree Dang DO    Assessment/Plan:   Justin Dixon is a very pleasant 60 y.o. female with a past medical history that includes GERD, asthma, GCA, migraines, polymyalgia rheumatica, anxiety here for f/u evaluation of headache. At today's visit, she reports that she is doing well. She is tolerating monthly Emgality injections and finds that they have been very helpful with both frequency and severity of her headaches. From an abortive standpoint, she continues with sumatriptan which tends to work within approximately 30 minutes. A few weeks following our previous visit she did have a fall and reports that she recovered from that in approximately 1 week. Overall she is doing well without any significant complaints. I have asked her to discuss increasing her Cymbalta or gabapentin with her prescribing providers since she notices some more of her diffuse MSK pain at this time. Workup:  - MRI brain without contrast 2021: No acute findings. No white matter changes. - CT head/C-spine without contrast 2023: No acute intracranial findings.   Multilevel degenerative changes in cervical spine (I have personally reviewed imaging and radiology read)     Preventative:  - we discussed headache hygiene and lifestyle factors that may improve headaches  - Emgality monthly injection  - Currently on through other providers: Cymbalta (mood), Gabapentin (pain), Prednisone (polymyalgia rheumatica), Triamterene-HCTZ (menopause)  - Past/ failed/contraindicated: Amlodipine, Topamax, metoprolol, propanolol, lisinopril, Elavil, Pamelor, verapamil  - future options: CGRP med, botox     Acute:  - discussed not taking over-the-counter or prescription pain medications more than 3 days per week to prevent medication overuse/rebound headache  - Sumatriptan 50-100mg  - Currently on through other providers: None  - Past/ failed/contraindicated: Fioricet, Amerge, Maxalt, Zomig, avoid steroids as she is already on daily steroids  - future options:  Triptan, prochlorperazine, Toradol IM or p.o., could consider trial of 5 days of Depakote 500 mg nightly, Ermalinda Oppenheim, reyvow, nurtec  Patient instructions   Headache Calendar  Please maintain a headache calendar  Consider using phone applications such as Migraine Fox or [employer] technology Migraine Tracker     Headache/migraine treatment:   Acute medications (for immediate treatment of a headache): It is ok to take ibuprofen, acetaminophen or naproxen (Advil, Tylenol,  Aleve, Excedrin) if they help your headaches you should limit these to No more than 2-3 times a week to avoid medication overuse/rebound headaches. For your more moderate to severe migraines take this medication early  Imitrex (Sumatriptan) 100mg tabs - take 1/2-1 tablet at the onset of headache. May repeat one time after 2 hours if pain has not resolved. (Max 2 a day and 10 a month)      Prescription preventive medications for headaches/migraines   (to take every day to help prevent headaches - not to take at the time of headache):  Emgality/Galcanezumab - 120mg injection monthly     Lifestyle Recommendations:  [x] SLEEP - Maintain a regular sleep schedule: Adults need at least 7-8 hours of uninterrupted a night. Maintain good sleep hygiene:  Going to bed and waking up at consistent times, avoiding excessive daytime naps, avoiding caffeinated beverages in the evening, avoid excessive stimulation in the evening and generally using bed primarily for sleeping. One hour before bedtime would recommend turning lights down lower, decreasing your activity (may read quietly, listen to music at a low volume).  When you get into bed, should eliminate all technology (no texting, emailing, playing with your phone, iPad or tablet in bed). [x] HYDRATION - Maintain good hydration. Drink  2L of fluid a day (4 typical small water bottles)  [x] DIET - Maintain good nutrition. In particular don't skip meals and try and eat healthy balanced meals regularly. [x] TRIGGERS - Look for other triggers and avoid them: Limit caffeine to 1-2 cups a day or less. Avoid dietary triggers that you have noticed bring on your headaches (this could include aged cheese, peanuts, MSG, aspartame and nitrates). [x] EXERCISE - physical exercise as we all know is good for you in many ways, and not only is good for your heart, but also is beneficial for your mental health, cognitive health and  chronic pain/headaches. I would encourage at the least 5 days of physical exercise weekly for at least 30 minutes. Education and Follow-up  [x] Please call with any questions or concerns. Of course if any new concerning symptoms go to the emergency department. [x] Follow up in 6 months  Subjective:   11/21/23: Since our last visit, she was seen in the ED on 5/28/2023 after a fall. With this she reports a brief LOC for a few seconds and hit her occiput. Following this she took a few days off of work due to dizziness. She also reports diffuse pain, but is unable to comment on headaches specifically. At today's visit, she reports improvement in terms of her headaches. She currently experiences about 8-12/30 headache days per month. She continues with Emgality monthly injections and finds that they have helped with the severity of her headaches. From an abortive standpoint, she continues with sumatriptan. Previous History:  5/12/23: Ms. Vane Marquez reports a longstanding history of migraines since she was a teenager. She was previously evaluated by neurology, but has not been seen by 1 in the last 10 years. To date, she has tried multiple medications for migraine management without significant benefit.   We discussed a variety of potential treatment options, but she was interested in trying Emgality monthly injections. From an abortive standpoint, sumatriptan works very well for her so I will have her continue with that. She did report a history of obstructive sleep apnea, but stated that she does not use a CPAP because she was unable to tolerate it. Currently she uses a mouthguard and nasal strips. I emphasized that if we have difficulty treating her headaches going forward I will have her see our sleep medicine team for discussion of treatment options regarding her sleep apnea. Past Medical History:     Past Medical History:   Diagnosis Date    Allergic     Asthma     Fibromyalgia     GERD (gastroesophageal reflux disease)     Migraine     Polymyalgia rheumatica (HCC)        Patient Active Problem List   Diagnosis    Allergic rhinitis    Asthma    GERD (gastroesophageal reflux disease)    Headache    Lumbar degenerative disc disease    Menopausal symptom    Polymyalgia rheumatica (HCC)    Anxiety    Giant cell arteritis syndrome (HCC)    Iron deficiency anemia secondary to inadequate dietary iron intake    Malabsorption of iron    Migraine       Medications:      Current Outpatient Medications   Medication Sig Dispense Refill    acetaminophen (TYLENOL) 325 mg tablet Take 325 mg by mouth      Atrovent HFA 17 MCG/ACT inhaler Inhale 2 puffs every 6 (six) hours as needed      Cholecalciferol (VITAMIN D) 2000 units CAPS Take 1 capsule by mouth       diphenhydrAMINE (BENADRYL) 25 mg capsule Take 25 mg by mouth      DULERA 200-5 MCG/ACT inhaler Inhale 2 puffs as needed       DULoxetine (CYMBALTA) 30 mg delayed release capsule       famotidine (PEPCID) 40 MG tablet Take 40 mg by mouth daily  3    gabapentin (NEURONTIN) 300 mg capsule Take 300 mg by mouth 2 (two) times a day   3    Galcanezumab-gnlm 120 MG/ML SOAJ Inject 120 mg under the skin every 30 (thirty) days Following the first month loading dose of 2 pens.  1 mL 11    guaiFENesin (ROBITUSSIN) 100 mg/5 mL syrup Take 1 tablet by mouth      halobetasol (ULTRAVATE) 0.05 % cream APPLY 1 APPLICATION TOPICALLY TWICE A DAY 50 g 13    levalbuterol (XOPENEX HFA) 45 mcg/act inhaler Inhale 2 puffs as needed       levalbuterol (XOPENEX) 1.25 mg/3 mL nebulizer solution Inhale 1.25 mg as needed       methocarbamol (ROBAXIN) 750 mg tablet Take 750 mg by mouth daily 1 and 1/2 tab daily      predniSONE 5 mg tablet Take 5 mg by mouth daily   3    SUMAtriptan (IMITREX) 100 mg tablet TAKE 1 TABLET UP TO ONCE DAILY AS NEEDED FOR MIGRAINE. DO NOT EXCEED 1 TABLET IN 24 HOURS 27 tablet 7    triamterene-hydrochlorothiazide (MAXZIDE-25) 37.5-25 mg per tablet TAKE 1 TABLET DAILY 90 tablet 3     No current facility-administered medications for this visit. Allergies:       Allergies   Allergen Reactions    Chlorhexidine Rash    Morphine Other (See Comments)     Other reaction(s): Hypotension    Fish Oil - Food Allergy Hives    Fluticasone-Salmeterol Other (See Comments)     Other reaction(s): Shaking    Other Hives    Pollen Extract     Pravastatin Other (See Comments)    Simvastatin Myalgia    Statins Myalgia    Zetia [Ezetimibe]      Tiredness, nausea, body aches    Penicillins Rash    Sulfa Antibiotics Rash     Pt claims to not have allergy to sulfa drugs       Family History:     Family History   Problem Relation Age of Onset    Hypertension Mother     Prostate cancer Father     No Known Problems Sister     Cancer Maternal Grandmother         unknown    No Known Problems Maternal Grandfather     No Known Problems Paternal Grandmother     No Known Problems Paternal Grandfather     No Known Problems Maternal Aunt     Cancer Maternal Aunt         unknown    No Known Problems Paternal Aunt     Breast cancer Paternal Aunt        Social History:     Social History     Socioeconomic History    Marital status: /Civil Union     Spouse name: Not on file    Number of children: Not on file    Years of education: Not on file    Highest education level: Not on file   Occupational History    Not on file   Tobacco Use    Smoking status: Never    Smokeless tobacco: Never   Vaping Use    Vaping Use: Never used   Substance and Sexual Activity    Alcohol use: Yes     Alcohol/week: 1.0 - 2.0 standard drink of alcohol     Types: 1 - 2 Glasses of wine per week    Drug use: No    Sexual activity: Not on file   Other Topics Concern    Not on file   Social History Narrative    Always uses seatbelt    Daily caffeine    No guns in the home     Social Determinants of Health     Financial Resource Strain: Not on file   Food Insecurity: Not on file   Transportation Needs: Not on file   Physical Activity: Not on file   Stress: Not on file   Social Connections: Not on file   Intimate Partner Violence: Not on file   Housing Stability: Not on file         Objective:   Physical Exam:                                                                 Vitals:            Constitutional:    /90 (BP Location: Right arm, Patient Position: Sitting, Cuff Size: Standard)   Pulse 91   Temp 98 °F (36.7 °C) (Temporal)   Ht 4' 11" (1.499 m)   Wt 80.8 kg (178 lb 3.2 oz)   SpO2 97%   BMI 35.99 kg/m²   BP Readings from Last 3 Encounters:   11/21/23 128/90   05/12/23 126/84   01/05/23 112/80     Pulse Readings from Last 3 Encounters:   11/21/23 91   05/12/23 94   01/05/23 75         Well developed, well nourished, well groomed. No dysmorphic features. HEENT:  Normocephalic atraumatic. See neuro exam   Chest:  Respirations appear regular and unlabored. Cardiovascular:  no observed significant swelling. Musculoskeletal:  (see below under neurologic exam for evaluation of motor function and gait)   Skin:  warm and dry, not diaphoretic. Psychiatric:  Normal behavior and appropriate affect       Neurological Examination:     Mental status/cognitive function:   Recent and remote memory intact. Attention span and concentration as well as fund of knowledge are appropriate for age.  Normal language and spontaneous speech. Cranial Nerves:  III, IV, VI-Pupils were equal, round. Extraocular movements were full and conjugate   VII-facial expression symmetric  VIII-hearing grossly intact bilaterally   Motor Exam: symmetric bulk throughout. no atrophy, fasciculations or abnormal movements noted. Coordination:  no apparent dysmetria, ataxia or tremor noted  Gait: steady casual gait  Review of Systems:   Constitutional:  Negative for appetite change, fatigue and fever. HENT: Negative. Negative for hearing loss, tinnitus, trouble swallowing and voice change. Eyes: Negative. Negative for photophobia, pain and visual disturbance. Respiratory: Negative. Negative for shortness of breath. Cardiovascular: Negative. Negative for palpitations. Gastrointestinal: Negative. Negative for nausea and vomiting. Endocrine: Negative. Negative for cold intolerance. Genitourinary: Negative. Negative for dysuria, frequency and urgency. Musculoskeletal:  Negative for back pain, gait problem, myalgias and neck pain. Skin: Negative. Negative for rash. Allergic/Immunologic: Negative. Neurological:  Positive for headaches. Negative for dizziness, tremors, seizures, syncope, facial asymmetry, speech difficulty, weakness, light-headedness and numbness. Hematological: Negative. Does not bruise/bleed easily. Psychiatric/Behavioral: Negative. Negative for confusion, hallucinations and sleep disturbance. All other systems reviewed and are negative. I have spent 15 minutes with Patient  today in which greater than 50% of this time was spent in counseling/coordination of care regarding Diagnostic results, Prognosis, Risks and benefits of tx options, Patient and family education, Importance of tx compliance, Impressions, Documenting in the medical record, and Obtaining or reviewing history  . I also spent 15 minutes non face to face for this patient the same day.      Activity Minutes Precharting/reviewing 10   Patient care/counseling 15   Postcharting/care coordination 5       Author:  Rukhsana Whyte DO 11/21/2023 9:59 AM

## 2024-03-21 DIAGNOSIS — Z86.69 HX OF MIGRAINE HEADACHES: ICD-10-CM

## 2024-03-22 RX ORDER — SUMATRIPTAN 100 MG/1
TABLET, FILM COATED ORAL
Qty: 27 TABLET | Refills: 0 | Status: SHIPPED | OUTPATIENT
Start: 2024-03-22

## 2024-03-22 NOTE — TELEPHONE ENCOUNTER
Please inform pt that I have sent in a refill on imitrex  But she needs a visit as well  Last visit with me was 1/2023  She can schedule PE.  Thanks

## 2024-03-25 NOTE — TELEPHONE ENCOUNTER
3/25 8:23am: Spoke w/PT, relayed information per PCP. PT will either call back later to schedule PE w/PCP, or schedule it via [employer] since she is walking into work.

## 2024-04-01 NOTE — TELEPHONE ENCOUNTER
4/1 12:30pm: Left detailed VM & sent PT message via [employer] to make an appt for her Annual Physical in order for PCP to continue to refill her medications.

## 2024-04-10 ENCOUNTER — VBI (OUTPATIENT)
Dept: ADMINISTRATIVE | Facility: OTHER | Age: 64
End: 2024-04-10

## 2024-04-10 ENCOUNTER — TELEPHONE (OUTPATIENT)
Dept: NEUROLOGY | Facility: CLINIC | Age: 64
End: 2024-04-10

## 2024-04-10 NOTE — TELEPHONE ENCOUNTER
LMOM offering patient earlier appointment on Friday April 12th at 3 pm with Dr. Mosley in [place]

## 2024-05-16 DIAGNOSIS — I10 ESSENTIAL HYPERTENSION: ICD-10-CM

## 2024-05-21 RX ORDER — TRIAMTERENE AND HYDROCHLOROTHIAZIDE 37.5; 25 MG/1; MG/1
TABLET ORAL
Qty: 30 TABLET | Refills: 1 | Status: SHIPPED | OUTPATIENT
Start: 2024-05-21 | End: 2024-05-30 | Stop reason: SDUPTHER

## 2024-05-25 DIAGNOSIS — G43.009 MIGRAINE WITHOUT AURA AND WITHOUT STATUS MIGRAINOSUS, NOT INTRACTABLE: ICD-10-CM

## 2024-05-27 RX ORDER — GALCANEZUMAB 120 MG/ML
INJECTION, SOLUTION SUBCUTANEOUS
Qty: 1 ML | Refills: 11 | Status: SHIPPED | OUTPATIENT
Start: 2024-05-27

## 2024-05-30 ENCOUNTER — OFFICE VISIT (OUTPATIENT)
Dept: FAMILY MEDICINE CLINIC | Facility: CLINIC | Age: 64
End: 2024-05-30
Payer: COMMERCIAL

## 2024-05-30 VITALS
HEART RATE: 97 BPM | DIASTOLIC BLOOD PRESSURE: 84 MMHG | WEIGHT: 179 LBS | BODY MASS INDEX: 36.15 KG/M2 | TEMPERATURE: 98.2 F | SYSTOLIC BLOOD PRESSURE: 130 MMHG | OXYGEN SATURATION: 98 %

## 2024-05-30 DIAGNOSIS — E78.5 HYPERLIPIDEMIA, UNSPECIFIED HYPERLIPIDEMIA TYPE: ICD-10-CM

## 2024-05-30 DIAGNOSIS — I10 ESSENTIAL HYPERTENSION: ICD-10-CM

## 2024-05-30 DIAGNOSIS — M51.36 LUMBAR DEGENERATIVE DISC DISEASE: ICD-10-CM

## 2024-05-30 DIAGNOSIS — Z12.31 ENCOUNTER FOR SCREENING MAMMOGRAM FOR MALIGNANT NEOPLASM OF BREAST: ICD-10-CM

## 2024-05-30 DIAGNOSIS — M35.3 POLYMYALGIA RHEUMATICA (HCC): ICD-10-CM

## 2024-05-30 DIAGNOSIS — Z86.69 HX OF MIGRAINE HEADACHES: ICD-10-CM

## 2024-05-30 DIAGNOSIS — Z00.00 ANNUAL PHYSICAL EXAM: Primary | ICD-10-CM

## 2024-05-30 PROCEDURE — 99396 PREV VISIT EST AGE 40-64: CPT | Performed by: FAMILY MEDICINE

## 2024-05-30 RX ORDER — TRIAMTERENE AND HYDROCHLOROTHIAZIDE 37.5; 25 MG/1; MG/1
1 TABLET ORAL DAILY
Qty: 90 TABLET | Refills: 1 | Status: SHIPPED | OUTPATIENT
Start: 2024-05-30

## 2024-05-30 RX ORDER — SUMATRIPTAN 100 MG/1
TABLET, FILM COATED ORAL
Qty: 27 TABLET | Refills: 0 | Status: SHIPPED | OUTPATIENT
Start: 2024-05-30

## 2024-05-30 NOTE — PROGRESS NOTES
Adult Annual Physical  Name: Gabriella Gruber      : 1960      MRN: [id number]  Encounter Provider: Gary Rico MD  Encounter Date: 2024   Encounter department: [facility]    Assessment & Plan   1. Annual physical exam  2. Encounter for screening mammogram for malignant neoplasm of breast  -     Mammo screening bilateral w 3d & cad; Future  3. Hyperlipidemia, unspecified hyperlipidemia type  Assessment & Plan:  Last , statin intolerant,fish oil allergic, recommend repatha, rx sent to pharmacy, recheck cholesterol, follow up 6 months  Orders:  -     Evolocumab 140 MG/ML SOAJ; Inject 1 mL (140 mg total) under the skin every 14 (fourteen) days  -     Lipid panel; Future  -     Lipid panel  4. Polymyalgia rheumatica (HCC)  Assessment & Plan:  Remains on low dose prednisone, tolerating and doing well  5. Essential hypertension  Comments:  Very well controlled on current therapy.  Orders:  -     triamterene-hydrochlorothiazide (MAXZIDE-25) 37.5-25 mg per tablet; Take 1 tablet by mouth daily  6. Hx of migraine headaches  Comments:  Continue current therapy with sumatriptan, she will follow-up with us to discuss options for prophylaxis.  Orders:  -     SUMAtriptan (IMITREX) 100 mg tablet; TAKE 1 TABLET UP TO ONCE DAILY AS NEEDED FOR MIGRAINE. DO NOT EXCEED 1 TABLET IN 24 HOURS  7. Lumbar degenerative disc disease  Assessment & Plan:  Improved with recent injection, seeing  physiatrist     Immunizations and preventive care screenings were discussed with patient today. Appropriate education was printed on patient's after visit summary.    Counseling:  Alcohol/drug use: discussed moderation in alcohol intake, the recommendations for healthy alcohol use, and avoidance of illicit drug use.  Exercise: the importance of regular exercise/physical activity was discussed. Recommend exercise 3-5 times per week for at least 30 minutes.          History of Present  Illness     Adult Annual Physical  Review of Systems   Constitutional:  Negative for activity change and appetite change.   Respiratory:  Negative for apnea and chest tightness.    Cardiovascular:  Negative for chest pain and palpitations.   Gastrointestinal:  Negative for abdominal distention and abdominal pain.   Musculoskeletal:  Negative for arthralgias and back pain.     Medical History Reviewed by provider this encounter:  Tobacco  Meds  Problems  Med Hx  Surg Hx  Fam Hx  Soc Hx        Objective     /84 (BP Location: Left arm, Cuff Size: Standard)   Pulse 97   Temp 98.2 °F (36.8 °C) (Tympanic)   Wt 81.2 kg (179 lb)   SpO2 98%   BMI 36.15 kg/m²     Physical Exam  Constitutional:       Appearance: Normal appearance.   Cardiovascular:      Rate and Rhythm: Normal rate and regular rhythm.      Pulses: Normal pulses.      Heart sounds: Normal heart sounds.   Musculoskeletal:         General: Normal range of motion.   Neurological:      General: No focal deficit present.      Mental Status: She is alert and oriented to person, place, and time.       Administrative Statements

## 2024-05-30 NOTE — ASSESSMENT & PLAN NOTE
Last , statin intolerant,fish oil allergic, recommend repatha, rx sent to pharmacy, recheck cholesterol, follow up 6 months

## 2024-06-06 ENCOUNTER — TELEPHONE (OUTPATIENT)
Dept: NEUROLOGY | Facility: CLINIC | Age: 64
End: 2024-06-06

## 2024-06-07 ENCOUNTER — OFFICE VISIT (OUTPATIENT)
Dept: NEUROLOGY | Facility: CLINIC | Age: 64
End: 2024-06-07
Payer: COMMERCIAL

## 2024-06-07 VITALS
TEMPERATURE: 98 F | OXYGEN SATURATION: 97 % | SYSTOLIC BLOOD PRESSURE: 139 MMHG | WEIGHT: 178.2 LBS | HEART RATE: 79 BPM | HEIGHT: 59 IN | BODY MASS INDEX: 35.92 KG/M2 | DIASTOLIC BLOOD PRESSURE: 75 MMHG

## 2024-06-07 DIAGNOSIS — G47.33 OBSTRUCTIVE SLEEP APNEA: ICD-10-CM

## 2024-06-07 DIAGNOSIS — E66.9 OBESITY (BMI 30-39.9): ICD-10-CM

## 2024-06-07 DIAGNOSIS — F41.9 ANXIETY DISORDER: ICD-10-CM

## 2024-06-07 DIAGNOSIS — G43.009 MIGRAINE WITHOUT AURA AND WITHOUT STATUS MIGRAINOSUS, NOT INTRACTABLE: Primary | ICD-10-CM

## 2024-06-07 PROCEDURE — 99215 OFFICE O/P EST HI 40 MIN: CPT | Performed by: STUDENT IN AN ORGANIZED HEALTH CARE EDUCATION/TRAINING PROGRAM

## 2024-06-07 RX ORDER — CYPROHEPTADINE HYDROCHLORIDE 4 MG/1
4 TABLET ORAL
Qty: 30 TABLET | Refills: 0 | Status: SHIPPED | OUTPATIENT
Start: 2024-06-07

## 2024-06-07 NOTE — PATIENT INSTRUCTIONS
Headache Calendar  Please maintain a headache calendar  Consider using phone applications such as Migraine [employer] or [place] Migraine Tracker     Headache/migraine treatment:   Acute medications (for immediate treatment of a headache):   It is ok to take ibuprofen, acetaminophen or naproxen (Advil, Tylenol,  Aleve, Excedrin) if they help your headaches you should limit these to No more than 2-3 times a week to avoid medication overuse/rebound headaches.      For your more moderate to severe migraines take this medication early  Imitrex (Sumatriptan) 100mg tabs - take 1/2-1 tablet at the onset of headache. May repeat one time after 2 hours if pain has not resolved.   (Max 2 a day and 10 a month)     Bridge  Cyproheptadine 4mg tablets at bedtime for 30 nights     Prescription preventive medications for headaches/migraines   Emgality/Galcanezumab - 120mg injection monthly     Lifestyle Recommendations:  [x] SLEEP - Maintain a regular sleep schedule: Adults need at least 7-8 hours of uninterrupted a night. Maintain good sleep hygiene:  Going to bed and waking up at consistent times, avoiding excessive daytime naps, avoiding caffeinated beverages in the evening, avoid excessive stimulation in the evening and generally using bed primarily for sleeping.  One hour before bedtime would recommend turning lights down lower, decreasing your activity (may read quietly, listen to music at a low volume). When you get into bed, should eliminate all technology (no texting, emailing, playing with your phone, iPad or tablet in bed).  [x] HYDRATION - Maintain good hydration.  Drink  2L of fluid a day (4 typical small water bottles)  [x] DIET - Maintain good nutrition. In particular don't skip meals and try and eat healthy balanced meals regularly.  [x] TRIGGERS - Look for other triggers and avoid them: Limit caffeine to 1-2 cups a day or less. Avoid dietary triggers that you have noticed bring on your headaches (this could include aged  cheese, peanuts, MSG, aspartame and nitrates).  [x] EXERCISE - physical exercise as we all know is good for you in many ways, and not only is good for your heart, but also is beneficial for your mental health, cognitive health and  chronic pain/headaches. I would encourage at the least 5 days of physical exercise weekly for at least 30 minutes.      Education and Follow-up  [x] Please call with any questions or concerns. Of course if any new concerning symptoms go to the emergency department.  [x] Follow up in 6 months

## 2024-06-07 NOTE — PROGRESS NOTES
Review of Systems   Constitutional:  Negative for appetite change, fatigue and fever.   HENT: Negative.  Negative for hearing loss, tinnitus, trouble swallowing and voice change.    Eyes: Negative.  Negative for photophobia, pain and visual disturbance.   Respiratory: Negative.  Negative for shortness of breath.    Cardiovascular: Negative.  Negative for palpitations.   Gastrointestinal: Negative.  Negative for nausea and vomiting.   Endocrine: Negative.  Negative for cold intolerance.   Genitourinary: Negative.  Negative for dysuria, frequency and urgency.   Musculoskeletal:  Positive for back pain (chronic). Negative for gait problem, myalgias, neck pain and neck stiffness.   Skin: Negative.  Negative for rash.   Allergic/Immunologic: Negative.    Neurological:  Positive for headaches (daily). Negative for dizziness, tremors, seizures, syncope, facial asymmetry, speech difficulty, weakness, light-headedness and numbness.   Hematological: Negative.  Does not bruise/bleed easily.   Psychiatric/Behavioral: Negative.  Negative for confusion, hallucinations and sleep disturbance.

## 2024-06-07 NOTE — PROGRESS NOTES
[facility] Neurology Concussion/Headache Center Consult - Follow up   PATIENT:  Gabriella Gruber  MRN:  [id number]  :  1960  DATE OF SERVICE:  2024  REFERRED BY: No ref. provider found  PMD: Keke Franklin DO    Assessment/Plan:   Gabriella Gruber is a very pleasant 62 y.o. female with a past medical history that includes GERD, asthma, GCA, migraines, polymyalgia rheumatica, anxiety here for f/u evaluation of headache.      At today's visit, she reports that she was generally doing well up until approximately 6 weeks ago when she had a significant flareup of her headaches.  She is currently experiencing them daily, but prior to 6 weeks ago she reported about 8 headache days per month which is a significant improvement compared to our initial visit last year.  She attributes her current flare to allergies for which she is taking Benadryl 3 times daily.  I have recommended that we continue with Emgality for prevention for the time being as well as sumatriptan for abortive management, but I will add on a 30-day course of cyproheptadine to see if that helps with the potential allergy component to her current flare.  She will update me in approximately 30 days and if she has any benefit with cyproheptadine, we can continue that going forward.  If it is ineffective, I will recommend switching the Emgality to a different CGRP injectable or Qulipta.    Workup:  - MRI brain without contrast 2021: No acute findings.  No white matter changes.  - CT head/C-spine without contrast 2023: No acute intracranial findings.  Multilevel degenerative changes in cervical spine (I have personally reviewed imaging and radiology read)     Preventative:  - we discussed headache hygiene and lifestyle factors that may improve headaches  - Emgality monthly injection  - Currently on through other providers: Cymbalta (mood), Gabapentin (pain), Prednisone (polymyalgia rheumatica), Triamterene-HCTZ (menopause)  -  Past/ failed/contraindicated: Amlodipine, Topamax, metoprolol, propanolol, lisinopril, Elavil, Pamelor, verapamil  - future options: Memantine, Diamox, CGRP med, botox     Acute:  - discussed not taking over-the-counter or prescription pain medications more than 3 days per week to prevent medication overuse/rebound headache  - Sumatriptan 50-100mg  - Cyproheptadine bridge for 30 nights  - Currently on through other providers: None  - Past/ failed/contraindicated: Fioricet, Amerge, Maxalt, Zomig, avoid steroids as she is already on daily steroids  - future options:  Triptan, prochlorperazine, Toradol IM or p.o., could consider trial of 5 days of Depakote 500 mg nightly, ubrelvy, reyvow, nurtec, zavzpret  Patient instructions   Headache Calendar  Please maintain a headache calendar  Consider using phone applications such as Migraine [employer] or [employer] Migraine Tracker     Headache/migraine treatment:   Acute medications (for immediate treatment of a headache):   It is ok to take ibuprofen, acetaminophen or naproxen (Advil, Tylenol,  Aleve, Excedrin) if they help your headaches you should limit these to No more than 2-3 times a week to avoid medication overuse/rebound headaches.      For your more moderate to severe migraines take this medication early  Imitrex (Sumatriptan) 100mg tabs - take 1/2-1 tablet at the onset of headache. May repeat one time after 2 hours if pain has not resolved.   (Max 2 a day and 10 a month)     Bridge  Cyproheptadine 4mg tablets at bedtime for 30 nights     Prescription preventive medications for headaches/migraines   Emgality/Galcanezumab - 120mg injection monthly     Lifestyle Recommendations:  [x] SLEEP - Maintain a regular sleep schedule: Adults need at least 7-8 hours of uninterrupted a night. Maintain good sleep hygiene:  Going to bed and waking up at consistent times, avoiding excessive daytime naps, avoiding caffeinated beverages in the evening, avoid excessive stimulation in the  evening and generally using bed primarily for sleeping.  One hour before bedtime would recommend turning lights down lower, decreasing your activity (may read quietly, listen to music at a low volume). When you get into bed, should eliminate all technology (no texting, emailing, playing with your phone, iPad or tablet in bed).  [x] HYDRATION - Maintain good hydration.  Drink  2L of fluid a day (4 typical small water bottles)  [x] DIET - Maintain good nutrition. In particular don't skip meals and try and eat healthy balanced meals regularly.  [x] TRIGGERS - Look for other triggers and avoid them: Limit caffeine to 1-2 cups a day or less. Avoid dietary triggers that you have noticed bring on your headaches (this could include aged cheese, peanuts, MSG, aspartame and nitrates).  [x] EXERCISE - physical exercise as we all know is good for you in many ways, and not only is good for your heart, but also is beneficial for your mental health, cognitive health and  chronic pain/headaches. I would encourage at the least 5 days of physical exercise weekly for at least 30 minutes.      Education and Follow-up  [x] Please call with any questions or concerns. Of course if any new concerning symptoms go to the emergency department.  [x] Follow up in 6 months  Subjective:   6/7/24: At today's visit, she reports daily headaches as of about 6 weeks ago (attributes it to allergies), but prior to that she was experiencing about 8 headache days per month.  She has been using Emgality monthly without any significant issues. From an abortive standpoint, sumatriptan tends to work well.  Outside of headaches, she endorses ongoing back pain for which she is pending follow-up imaging.    Previous History:  11/21/23: Since our last visit, she was seen in the ED on 5/28/2023 after a fall.  With this she reports a brief LOC for a few seconds and hit her occiput.  Following this she took a few days off of work due to dizziness. She also reports  diffuse pain, but is unable to comment on headaches specifically. At today's visit, she reports improvement in terms of her headaches.  She currently experiences about 8-12/30 headache days per month.  She continues with Emgality monthly injections and finds that they have helped with the severity of her headaches.  From an abortive standpoint, she continues with sumatriptan.  5/12/23: Ms. Gruber reports a longstanding history of migraines since she was a teenager.  She was previously evaluated by neurology, but has not been seen by 1 in the last 10 years.  To date, she has tried multiple medications for migraine management without significant benefit.  We discussed a variety of potential treatment options, but she was interested in trying Emgality monthly injections.  From an abortive standpoint, sumatriptan works very well for her so I will have her continue with that.  She did report a history of obstructive sleep apnea, but stated that she does not use a CPAP because she was unable to tolerate it.  Currently she uses a mouthguard and nasal strips.  I emphasized that if we have difficulty treating her headaches going forward I will have her see our sleep medicine team for discussion of treatment options regarding her sleep apnea.    Past Medical History:     Past Medical History:   Diagnosis Date    Allergic     Asthma     Fibromyalgia     GERD (gastroesophageal reflux disease)     Migraine     Polymyalgia rheumatica (HCC)        Patient Active Problem List   Diagnosis    Allergic rhinitis    Asthma    GERD (gastroesophageal reflux disease)    Headache    Lumbar degenerative disc disease    Menopausal symptom    Polymyalgia rheumatica (HCC)    Anxiety    Iron deficiency anemia secondary to inadequate dietary iron intake    Malabsorption of iron    Migraine    Hyperlipidemia       Medications:      Current Outpatient Medications   Medication Sig Dispense Refill    acetaminophen (TYLENOL) 325 mg tablet Take 325  mg by mouth      Atrovent HFA 17 MCG/ACT inhaler Inhale 2 puffs every 6 (six) hours as needed      Cholecalciferol (VITAMIN D) 2000 units CAPS Take 1 capsule by mouth       diphenhydrAMINE (BENADRYL) 25 mg capsule Take 25 mg by mouth 3 (three) times a day      DULERA 200-5 MCG/ACT inhaler Inhale 2 puffs as needed       DULoxetine (CYMBALTA) 30 mg delayed release capsule       Emgality 120 MG/ML SOAJ INJECT 120 MG UNDER THE SKIN EVERY 30 DAYS FOLLOWING THE FIRST MONTH LOADING DOSE OF 2 PENS 1 mL 11    famotidine (PEPCID) 40 MG tablet Take 40 mg by mouth daily  3    gabapentin (NEURONTIN) 300 mg capsule Take 600 mg by mouth 3 (three) times a day  3    guaiFENesin (ROBITUSSIN) 100 mg/5 mL syrup Take 1 tablet by mouth      halobetasol (ULTRAVATE) 0.05 % cream APPLY 1 APPLICATION TOPICALLY TWICE A DAY 50 g 13    levalbuterol (XOPENEX HFA) 45 mcg/act inhaler Inhale 2 puffs as needed       levalbuterol (XOPENEX) 1.25 mg/3 mL nebulizer solution Inhale 1.25 mg as needed       methocarbamol (ROBAXIN) 750 mg tablet Take 750 mg by mouth daily 1 and 1/2 tab daily      predniSONE 5 mg tablet Take 5 mg by mouth daily   3    SUMAtriptan (IMITREX) 100 mg tablet TAKE 1 TABLET UP TO ONCE DAILY AS NEEDED FOR MIGRAINE. DO NOT EXCEED 1 TABLET IN 24 HOURS 27 tablet 0    triamterene-hydrochlorothiazide (MAXZIDE-25) 37.5-25 mg per tablet Take 1 tablet by mouth daily 90 tablet 1    Evolocumab 140 MG/ML SOAJ Inject 1 mL (140 mg total) under the skin every 14 (fourteen) days (Patient not taking: Reported on 6/7/2024) 2 mL 5     No current facility-administered medications for this visit.        Allergies:      Allergies   Allergen Reactions    Chlorhexidine Rash    Morphine Other (See Comments)     Other reaction(s): Hypotension    Fish Oil - Food Allergy Hives    Fluticasone-Salmeterol Other (See Comments)     Other reaction(s): Shaking    Other Hives    Pollen Extract     Pravastatin Other (See Comments)    Simvastatin Myalgia    Statins  "Myalgia    Zetia [Ezetimibe]      Tiredness, nausea, body aches    Penicillins Rash    Sulfa Antibiotics Rash     Pt claims to not have allergy to sulfa drugs       Family History:     Family History   Problem Relation Age of Onset    Hypertension Mother     Prostate cancer Father     No Known Problems Sister     Cancer Maternal Grandmother         unknown    No Known Problems Maternal Grandfather     No Known Problems Paternal Grandmother     No Known Problems Paternal Grandfather     No Known Problems Maternal Aunt     Cancer Maternal Aunt         unknown    No Known Problems Paternal Aunt     Breast cancer Paternal Aunt        Social History:     Social History     Socioeconomic History    Marital status: /Civil Union     Spouse name: Not on file    Number of children: Not on file    Years of education: Not on file    Highest education level: Not on file   Occupational History    Not on file   Tobacco Use    Smoking status: Never    Smokeless tobacco: Never   Vaping Use    Vaping status: Never Used   Substance and Sexual Activity    Alcohol use: Yes     Alcohol/week: 1.0 - 2.0 standard drink of alcohol     Types: 1 - 2 Glasses of wine per week    Drug use: No    Sexual activity: Not on file   Other Topics Concern    Not on file   Social History Narrative    Always uses seatbelt    Daily caffeine    No guns in the home     Social Determinants of Health     Financial Resource Strain: Not on file   Food Insecurity: Not on file   Transportation Needs: Not on file   Physical Activity: Not on file   Stress: Not on file   Social Connections: Not on file   Intimate Partner Violence: Not on file   Housing Stability: Not on file         Objective:   Physical Exam:                                                               Vitals:            Constitutional:  /75 (BP Location: Left arm, Patient Position: Sitting, Cuff Size: Adult)   Pulse 79   Temp 98 °F (36.7 °C) (Temporal)   Ht 4' 11\" (1.499 m)   Wt " 80.8 kg (178 lb 3.2 oz)   SpO2 97%   BMI 35.99 kg/m²   BP Readings from Last 3 Encounters:   06/07/24 139/75   05/30/24 130/84   11/21/23 128/90     Pulse Readings from Last 3 Encounters:   06/07/24 79   05/30/24 97   11/21/23 91         Well developed, well nourished, well groomed. No dysmorphic features.       HEENT:  Normocephalic atraumatic. See neuro exam   Chest:  Respirations appear regular and unlabored.    Cardiovascular:  no observed significant swelling.    Musculoskeletal:  (see below under neurologic exam for evaluation of motor function and gait)   Skin:  warm and dry, not diaphoretic.    Psychiatric:  Normal behavior and appropriate affect       Neurological Examination:     Mental status/cognitive function:   Recent and remote memory intact. Attention span and concentration as well as fund of knowledge are appropriate for age. Normal language and spontaneous speech.  Cranial Nerves:  III, IV, VI-Pupils were equal, round. Extraocular movements were full and conjugate   VII-facial expression symmetric  VIII-hearing grossly intact bilaterally   Motor Exam: symmetric bulk throughout. no atrophy, fasciculations or abnormal movements noted.   Coordination:  no apparent dysmetria, ataxia or tremor noted  Gait: steady casual gait  Review of Systems:   Constitutional:  Negative for appetite change, fatigue and fever.   HENT: Negative.  Negative for hearing loss, tinnitus, trouble swallowing and voice change.    Eyes: Negative.  Negative for photophobia, pain and visual disturbance.   Respiratory: Negative.  Negative for shortness of breath.    Cardiovascular: Negative.  Negative for palpitations.   Gastrointestinal: Negative.  Negative for nausea and vomiting.   Endocrine: Negative.  Negative for cold intolerance.   Genitourinary: Negative.  Negative for dysuria, frequency and urgency.   Musculoskeletal:  Positive for back pain (chronic). Negative for gait problem, myalgias, neck pain and neck stiffness.    Skin: Negative.  Negative for rash.   Allergic/Immunologic: Negative.    Neurological:  Positive for headaches (daily). Negative for dizziness, tremors, seizures, syncope, facial asymmetry, speech difficulty, weakness, light-headedness and numbness.   Hematological: Negative.  Does not bruise/bleed easily.   Psychiatric/Behavioral: Negative.  Negative for confusion, hallucinations and sleep disturbance.      I have spent 25 minutes with Patient  today in which greater than 50% of this time was spent in counseling/coordination of care regarding Prognosis, Risks and benefits of tx options, Patient and family education, Impressions, Documenting in the medical record, Reviewing / ordering tests, medicine, procedures  , and Obtaining or reviewing history  . I also spent 15 minutes non face to face for this patient the same day.     Activity Minutes   Precharting/reviewing 10   Patient care/counseling 25   Postcharting/care coordination 5       Author:  Sergio Mosley DO 6/7/2024 10:02 AM

## 2024-07-12 ENCOUNTER — TELEPHONE (OUTPATIENT)
Dept: FAMILY MEDICINE CLINIC | Facility: CLINIC | Age: 64
End: 2024-07-12

## 2024-07-12 ENCOUNTER — CONSULT (OUTPATIENT)
Dept: FAMILY MEDICINE CLINIC | Facility: CLINIC | Age: 64
End: 2024-07-12
Payer: COMMERCIAL

## 2024-07-12 VITALS
OXYGEN SATURATION: 99 % | HEIGHT: 59 IN | HEART RATE: 77 BPM | SYSTOLIC BLOOD PRESSURE: 122 MMHG | DIASTOLIC BLOOD PRESSURE: 76 MMHG | BODY MASS INDEX: 36.25 KG/M2 | TEMPERATURE: 97.8 F | RESPIRATION RATE: 16 BRPM | WEIGHT: 179.8 LBS

## 2024-07-12 DIAGNOSIS — Z01.818 PRE-OP EXAMINATION: Primary | ICD-10-CM

## 2024-07-12 DIAGNOSIS — M51.36 LUMBAR DEGENERATIVE DISC DISEASE: ICD-10-CM

## 2024-07-12 DIAGNOSIS — M35.3 POLYMYALGIA RHEUMATICA (HCC): ICD-10-CM

## 2024-07-12 PROCEDURE — 99214 OFFICE O/P EST MOD 30 MIN: CPT | Performed by: FAMILY MEDICINE

## 2024-07-12 NOTE — PROGRESS NOTES
Assessment/Plan:       Problem List Items Addressed This Visit          Musculoskeletal and Integument    Lumbar degenerative disc disease     Plan for surgery on 7/25            Rheumatology    Polymyalgia rheumatica (HCC)     Remains on low dose prednisone, tolerating and doing well,           Other Visit Diagnoses       Pre-op examination    -  Primary             Surgical clearance:  The patient is found to be at low risk from a cardiovascular standpoint.  Additional cardiac testing is not  necessary.  Preoperative testing reviewed includes ECG, BMP, CBC.  Re-evaluation is needed if the patient should present with symptoms prior to surgery/ procedure.  Surgical risk stratfication will be faxed to Dr. Latif.    CBC, PT INR pending, ECG results appear unchanged read by [facility] cardiology, stress test 1/30/2023 with no ischemic changes.    Subjective:      Patient ID: Gabriella Gruber is a 63 y.o. female.    HPI    63 year old    Can walk up flight of steps with no shortness of rbeath or chest pain    Severe back pain with walking.    No reaction to anethesia in past, allergic to cholrhexadine.    Evolocumab denied by insurance.    The following portions of the patient's history were reviewed and updated as appropriate: allergies, current medications, past family history, past medical history, past social history, past surgical history and problem list.      Current Outpatient Medications:     acetaminophen (TYLENOL) 325 mg tablet, Take 325 mg by mouth, Disp: , Rfl:     Atrovent HFA 17 MCG/ACT inhaler, Inhale 2 puffs every 6 (six) hours as needed, Disp: , Rfl:     Cholecalciferol (VITAMIN D) 2000 units CAPS, Take 1 capsule by mouth , Disp: , Rfl:     cyproheptadine (PERIACTIN) 4 mg tablet, Take 1 tablet (4 mg total) by mouth daily at bedtime, Disp: 30 tablet, Rfl: 0    diphenhydrAMINE (BENADRYL) 25 mg capsule, Take 25 mg by mouth 3 (three) times a day, Disp: , Rfl:     DULERA 200-5 MCG/ACT inhaler,  "Inhale 2 puffs as needed , Disp: , Rfl:     DULoxetine (CYMBALTA) 30 mg delayed release capsule, , Disp: , Rfl:     Emgality 120 MG/ML SOAJ, INJECT 120 MG UNDER THE SKIN EVERY 30 DAYS FOLLOWING THE FIRST MONTH LOADING DOSE OF 2 PENS, Disp: 1 mL, Rfl: 11    famotidine (PEPCID) 40 MG tablet, Take 40 mg by mouth daily, Disp: , Rfl: 3    gabapentin (NEURONTIN) 300 mg capsule, Take 600 mg by mouth 3 (three) times a day, Disp: , Rfl: 3    guaiFENesin (ROBITUSSIN) 100 mg/5 mL syrup, Take 1 tablet by mouth, Disp: , Rfl:     halobetasol (ULTRAVATE) 0.05 % cream, APPLY 1 APPLICATION TOPICALLY TWICE A DAY, Disp: 50 g, Rfl: 13    levalbuterol (XOPENEX HFA) 45 mcg/act inhaler, Inhale 2 puffs as needed , Disp: , Rfl:     levalbuterol (XOPENEX) 1.25 mg/3 mL nebulizer solution, Inhale 1.25 mg as needed , Disp: , Rfl:     methocarbamol (ROBAXIN) 750 mg tablet, Take 750 mg by mouth daily 1 and 1/2 tab daily, Disp: , Rfl:     predniSONE 5 mg tablet, Take 5 mg by mouth daily , Disp: , Rfl: 3    SUMAtriptan (IMITREX) 100 mg tablet, TAKE 1 TABLET UP TO ONCE DAILY AS NEEDED FOR MIGRAINE. DO NOT EXCEED 1 TABLET IN 24 HOURS, Disp: 27 tablet, Rfl: 0    triamterene-hydrochlorothiazide (MAXZIDE-25) 37.5-25 mg per tablet, Take 1 tablet by mouth daily, Disp: 90 tablet, Rfl: 1    Evolocumab 140 MG/ML SOAJ, Inject 1 mL (140 mg total) under the skin every 14 (fourteen) days (Patient not taking: Reported on 6/7/2024), Disp: 2 mL, Rfl: 5     Review of Systems   Constitutional:  Negative for activity change and appetite change.   Respiratory:  Negative for apnea and chest tightness.    Cardiovascular:  Negative for chest pain and palpitations.   Gastrointestinal:  Negative for abdominal distention and abdominal pain.   Musculoskeletal:  Negative for arthralgias and back pain.         Objective:      /76 (BP Location: Left arm, Patient Position: Sitting, Cuff Size: Adult)   Pulse 77   Temp 97.8 °F (36.6 °C) (Tympanic)   Resp 16   Ht 4' 11\" " (1.499 m)   Wt 81.6 kg (179 lb 12.8 oz)   SpO2 99%   BMI 36.32 kg/m²          Physical Exam  Constitutional:       Appearance: She is obese.   Cardiovascular:      Rate and Rhythm: Normal rate and regular rhythm.      Pulses: Normal pulses.      Heart sounds: Normal heart sounds.   Pulmonary:      Effort: Pulmonary effort is normal.      Breath sounds: Normal breath sounds.   Musculoskeletal:         General: Normal range of motion.   Neurological:      General: No focal deficit present.      Mental Status: She is alert and oriented to person, place, and time.           Gary Rico MD

## 2024-07-16 ENCOUNTER — TELEPHONE (OUTPATIENT)
Dept: FAMILY MEDICINE CLINIC | Facility: CLINIC | Age: 64
End: 2024-07-16

## 2024-07-16 DIAGNOSIS — Z86.69 HX OF MIGRAINE HEADACHES: ICD-10-CM

## 2024-07-16 NOTE — TELEPHONE ENCOUNTER
7/16 8:40am: Per [facility]: Please fax 7/12/24 pre-op clearance notes and paper brought in by PT to the [facility] location.

## 2024-07-17 ENCOUNTER — TELEPHONE (OUTPATIENT)
Age: 64
End: 2024-07-17

## 2024-07-17 RX ORDER — SUMATRIPTAN 100 MG/1
TABLET, FILM COATED ORAL
Qty: 27 TABLET | Refills: 5 | Status: SHIPPED | OUTPATIENT
Start: 2024-07-17

## 2024-07-17 NOTE — TELEPHONE ENCOUNTER
APPT R/S See [employer] message    Per enc 23-Emgality PA  24    PA initiated on CMM. (Key: [id number])    Awaiting determination

## 2024-07-25 ENCOUNTER — TRANSITIONAL CARE MANAGEMENT (OUTPATIENT)
Dept: FAMILY MEDICINE CLINIC | Facility: CLINIC | Age: 64
End: 2024-07-25

## 2024-11-26 DIAGNOSIS — L30.9 HAND DERMATITIS: ICD-10-CM

## 2024-11-27 RX ORDER — HALOBETASOL PROPIONATE 0.5 MG/G
CREAM TOPICAL 2 TIMES DAILY
Qty: 150 G | Refills: 1 | Status: SHIPPED | OUTPATIENT
Start: 2024-11-27

## 2024-12-05 ENCOUNTER — TELEPHONE (OUTPATIENT)
Dept: NEUROLOGY | Facility: CLINIC | Age: 64
End: 2024-12-05

## 2024-12-05 NOTE — TELEPHONE ENCOUNTER
LMOM for pt to confirm their    1:30p appt on  12/13   w/ Dimitri . Reminded pt to arrive 15 mins prior to appt to check in with . Gave call back number [phone] to cancel/reschedule.

## 2024-12-09 DIAGNOSIS — I10 ESSENTIAL HYPERTENSION: ICD-10-CM

## 2024-12-10 RX ORDER — TRIAMTERENE AND HYDROCHLOROTHIAZIDE 37.5; 25 MG/1; MG/1
1 TABLET ORAL DAILY
Qty: 90 TABLET | Refills: 1 | Status: SHIPPED | OUTPATIENT
Start: 2024-12-10

## 2024-12-13 ENCOUNTER — OFFICE VISIT (OUTPATIENT)
Dept: NEUROLOGY | Facility: CLINIC | Age: 64
End: 2024-12-13
Payer: COMMERCIAL

## 2024-12-13 VITALS
HEART RATE: 95 BPM | WEIGHT: 177 LBS | BODY MASS INDEX: 35.68 KG/M2 | TEMPERATURE: 97.8 F | SYSTOLIC BLOOD PRESSURE: 132 MMHG | DIASTOLIC BLOOD PRESSURE: 86 MMHG | HEIGHT: 59 IN | OXYGEN SATURATION: 97 %

## 2024-12-13 DIAGNOSIS — G47.33 OBSTRUCTIVE SLEEP APNEA: ICD-10-CM

## 2024-12-13 DIAGNOSIS — E66.9 OBESITY (BMI 30-39.9): ICD-10-CM

## 2024-12-13 DIAGNOSIS — F41.9 ANXIETY DISORDER: ICD-10-CM

## 2024-12-13 DIAGNOSIS — G43.009 MIGRAINE WITHOUT AURA AND WITHOUT STATUS MIGRAINOSUS, NOT INTRACTABLE: Primary | ICD-10-CM

## 2024-12-13 PROCEDURE — 99214 OFFICE O/P EST MOD 30 MIN: CPT | Performed by: STUDENT IN AN ORGANIZED HEALTH CARE EDUCATION/TRAINING PROGRAM

## 2024-12-13 RX ORDER — CYPROHEPTADINE HYDROCHLORIDE 4 MG/1
4 TABLET ORAL
Qty: 30 TABLET | Refills: 6 | Status: SHIPPED | OUTPATIENT
Start: 2024-12-13

## 2024-12-13 NOTE — ASSESSMENT & PLAN NOTE
At today's visit, while she generally reports that she is doing well, she feels as though her headaches have increased in frequency lately, but possibly attributes that to back pain that she has been dealing with.  She underwent a lumbar fusion and laminectomy in July and is pending a cervical epidural steroid injection.  We decided that it would be best to continue with Emgality at this time until she gets the steroid injection to see if her neck is contributing to some of her headaches.  She was very happy with the cyproheptadine that I previously put her on so I once again prescribed that, but this time we will use it as a daily preventative.  From an abortive standpoint, sumatriptan is effective so she will continue with that going forward.  Her insurance is changing in a few months so I have asked her to keep us updated and we can decide what to do with Emgality at that time depending on how she is doing.

## 2024-12-13 NOTE — PROGRESS NOTES
Since your last visit are your headaches - Remained the Same    Any change to the headache type? No    What is your current headache frequency (total headache days out of 30): 8-12/30 (of those, how many are more severe: 1)    Are you taking your current medications as prescribed? yes      Do you have any side effects? no    How may days per week do you take an abortive medicine? 2-3/7  Sumatriptan

## 2024-12-13 NOTE — PROGRESS NOTES
Neurology Ambulatory Visit  Name: Gabriella Gruber       : 1960       MRN: [id number]   Encounter Provider: Sergio Mosley DO   Encounter Date: 2024  Encounter department: [facility]    Gabriella Gruber is a very pleasant 64 y.o. female with a past medical history that includes GERD, asthma, GCA, migraines, polymyalgia rheumatica, anxiety here for f/u evaluation of headache.      Assessment and Plan  1. Migraine without aura and without status migrainosus, not intractable  Assessment & Plan:  At today's visit, while she generally reports that she is doing well, she feels as though her headaches have increased in frequency lately, but possibly attributes that to back pain that she has been dealing with.  She underwent a lumbar fusion and laminectomy in July and is pending a cervical epidural steroid injection.  We decided that it would be best to continue with Emgality at this time until she gets the steroid injection to see if her neck is contributing to some of her headaches.  She was very happy with the cyproheptadine that I previously put her on so I once again prescribed that, but this time we will use it as a daily preventative.  From an abortive standpoint, sumatriptan is effective so she will continue with that going forward.  Her insurance is changing in a few months so I have asked her to keep us updated and we can decide what to do with Emgality at that time depending on how she is doing.  Orders:  -     cyproheptadine (PERIACTIN) 4 mg tablet; Take 1 tablet (4 mg total) by mouth daily at bedtime  2. Anxiety disorder  3. Obesity (BMI 30-39.9)  4. Obstructive sleep apnea      She will Return in about 6 months (around 2025).    Workup  - MRI brain without contrast 2021: No acute findings.  No white matter changes.  - CT head/C-spine without contrast 2023: No acute intracranial findings.  Multilevel degenerative changes in cervical spine (I have  personally reviewed imaging and radiology read)     Preventative:  - we discussed headache hygiene and lifestyle factors that may improve headaches  - Emgality monthly injection  - Cyproheptadine 4mg HS  - Currently on through other providers: Cymbalta (mood), Gabapentin (pain), Prednisone (polymyalgia rheumatica), Triamterene-HCTZ (menopause)  - Past/ failed/contraindicated: Amlodipine, Topamax, metoprolol, propanolol, lisinopril, Elavil, Pamelor, verapamil  - future options: Memantine, Diamox, CGRP med, botox     Acute:  - discussed not taking over-the-counter or prescription pain medications more than 3 days per week to prevent medication overuse/rebound headache  - Sumatriptan 50-100mg  - Currently on through other providers: None  - Past/ failed/contraindicated: Fioricet, Amerge, Maxalt, Zomig, avoid steroids as she is already on daily steroids  - future options:  Triptan, prochlorperazine, Toradol IM or p.o., could consider trial of 5 days of Depakote 500 mg nightly, ubrelvy, reyvow, nurtec, zavzpret    History of Present Illness       Interval updates:  12/13/24: At todays visit, she reports about 8-12 headache days per month. She is using Emgality monthly injections without any issues., but does not find it to be effective. She liked the course of cyproheptadine. From an abortive standpoint, sumatriptan is effective. She thinks her back may be playing a role - had a laminectomy and fusion in July 2024. Pending an TARA for the cervical spine in the near future.     Prior History  6/7/24: At today's visit, she reports daily headaches as of about 6 weeks ago (attributes it to allergies), but prior to that she was experiencing about 8 headache days per month.  She has been using Emgality monthly without any significant issues. From an abortive standpoint, sumatriptan tends to work well.  Outside of headaches, she endorses ongoing back pain for which she is pending follow-up imaging.  11/21/23: Since our last  "visit, she was seen in the ED on 5/28/2023 after a fall.  With this she reports a brief LOC for a few seconds and hit her occiput.  Following this she took a few days off of work due to dizziness. She also reports diffuse pain, but is unable to comment on headaches specifically. At today's visit, she reports improvement in terms of her headaches.  She currently experiences about 8-12/30 headache days per month.  She continues with Emgality monthly injections and finds that they have helped with the severity of her headaches.  From an abortive standpoint, she continues with sumatriptan.  5/12/23: Ms. Gruber reports a longstanding history of migraines since she was a teenager.  She was previously evaluated by neurology, but has not been seen by 1 in the last 10 years.  To date, she has tried multiple medications for migraine management without significant benefit.  We discussed a variety of potential treatment options, but she was interested in trying Emgality monthly injections.  From an abortive standpoint, sumatriptan works very well for her so I will have her continue with that.  She did report a history of obstructive sleep apnea, but stated that she does not use a CPAP because she was unable to tolerate it.  Currently she uses a mouthguard and nasal strips.  I emphasized that if we have difficulty treating her headaches going forward I will have her see our sleep medicine team for discussion of treatment options regarding her sleep apnea.          Objective     Physical Exam:                                                               Vitals:            Constitutional:    /86 (BP Location: Right arm, Patient Position: Sitting, Cuff Size: Standard)   Pulse 95   Temp 97.8 °F (36.6 °C) (Temporal)   Ht 4' 11\" (1.499 m)   Wt 80.3 kg (177 lb)   SpO2 97%   BMI 35.75 kg/m²   BP Readings from Last 3 Encounters:   12/13/24 132/86   07/12/24 122/76   06/07/24 139/75     Pulse Readings from Last 3 " Encounters:   12/13/24 95   07/12/24 77   06/07/24 79         Well developed, well nourished, well groomed. No dysmorphic features.       HEENT:  Normocephalic atraumatic. See neuro exam   Chest:  Respirations appear regular and unlabored.    Cardiovascular:  no observed significant swelling.    Musculoskeletal:  (see below under neurologic exam for evaluation of motor function and gait)   Skin:  warm and dry, not diaphoretic.    Psychiatric:  Normal behavior and appropriate affect       Neurological Examination:     Mental status/cognitive function:   Recent and remote memory intact. Attention span and concentration as well as fund of knowledge are appropriate for age. Normal language and spontaneous speech.  Cranial Nerves:  III, IV, VI-Pupils were equal, round. Extraocular movements were full and conjugate   VII-facial expression symmetric  VIII-hearing grossly intact bilaterally   Motor Exam: symmetric bulk throughout. no atrophy, fasciculations or abnormal movements noted.   Coordination:  no apparent dysmetria, ataxia or tremor noted  Gait: steady casual gait    I have spent 15 minutes with the patient today in which greater than 50% of this time was spent in counseling/coordination of care regarding Prognosis, Risks and benefits of tx options, Patient and family education, Importance of tx compliance, Impressions, Documenting in the medical record, Reviewing / ordering tests, medicine, procedures  , and Obtaining or reviewing history  . I also spent 15 minutes non face to face for this patient the same day.     Activity Minutes   Precharting/reviewing 10   Patient care/counseling 15   Postcharting/care coordination 5       Voice recognition software was used in the generation of this note. There may be unintentional errors including grammatical errors, spelling errors, or pronoun errors.

## 2024-12-13 NOTE — PATIENT INSTRUCTIONS
Headache Calendar  Please maintain a headache calendar  Consider using phone applications such as Migraine Fox or [place] Migraine Tracker     Headache/migraine treatment:   Acute medications (for immediate treatment of a headache):   It is ok to take ibuprofen, acetaminophen or naproxen (Advil, Tylenol,  Aleve, Excedrin) if they help your headaches you should limit these to No more than 2-3 times a week to avoid medication overuse/rebound headaches.      For your more moderate to severe migraines take this medication early  Imitrex (Sumatriptan) 100mg tabs - take 1/2-1 tablet at the onset of headache. May repeat one time after 2 hours if pain has not resolved.   (Max 2 a day and 10 a month)     Prescription preventive medications for headaches/migraines   Emgality/Galcanezumab - 120mg injection monthly  Cyproheptadine - 4mg tablet at bedtime     Lifestyle Recommendations:  [x] SLEEP - Maintain a regular sleep schedule: Adults need at least 7-8 hours of uninterrupted a night. Maintain good sleep hygiene:  Going to bed and waking up at consistent times, avoiding excessive daytime naps, avoiding caffeinated beverages in the evening, avoid excessive stimulation in the evening and generally using bed primarily for sleeping.  One hour before bedtime would recommend turning lights down lower, decreasing your activity (may read quietly, listen to music at a low volume). When you get into bed, should eliminate all technology (no texting, emailing, playing with your phone, iPad or tablet in bed).  [x] HYDRATION - Maintain good hydration.  Drink  2L of fluid a day (4 typical small water bottles)  [x] DIET - Maintain good nutrition. In particular don't skip meals and try and eat healthy balanced meals regularly.  [x] TRIGGERS - Look for other triggers and avoid them: Limit caffeine to 1-2 cups a day or less. Avoid dietary triggers that you have noticed bring on your headaches (this could include aged cheese, peanuts, MSG,  aspartame and nitrates).  [x] EXERCISE - physical exercise as we all know is good for you in many ways, and not only is good for your heart, but also is beneficial for your mental health, cognitive health and  chronic pain/headaches. I would encourage at the least 5 days of physical exercise weekly for at least 30 minutes.      Education and Follow-up  [x] Please call with any questions or concerns. Of course if any new concerning symptoms go to the emergency department.  [x] Follow up in 6 months

## 2025-04-15 DIAGNOSIS — Z86.69 HX OF MIGRAINE HEADACHES: ICD-10-CM

## 2025-04-15 DIAGNOSIS — I10 ESSENTIAL HYPERTENSION: ICD-10-CM

## 2025-04-16 RX ORDER — TRIAMTERENE AND HYDROCHLOROTHIAZIDE 37.5; 25 MG/1; MG/1
1 TABLET ORAL DAILY
Qty: 90 TABLET | Refills: 0 | Status: SHIPPED | OUTPATIENT
Start: 2025-04-16

## 2025-04-16 RX ORDER — SUMATRIPTAN SUCCINATE 100 MG/1
TABLET ORAL
Qty: 27 TABLET | Refills: 0 | Status: SHIPPED | OUTPATIENT
Start: 2025-04-16

## 2025-04-17 DIAGNOSIS — L30.9 HAND DERMATITIS: ICD-10-CM

## 2025-04-28 DIAGNOSIS — L30.9 HAND DERMATITIS: ICD-10-CM

## 2025-04-29 RX ORDER — HALOBETASOL PROPIONATE 0.5 MG/G
CREAM TOPICAL 2 TIMES DAILY
Qty: 150 G | Refills: 1 | Status: SHIPPED | OUTPATIENT
Start: 2025-04-29

## 2025-05-01 RX ORDER — HALOBETASOL PROPIONATE 0.5 MG/G
CREAM TOPICAL 2 TIMES DAILY
Qty: 150 G | Refills: 1 | OUTPATIENT
Start: 2025-05-01

## 2025-05-02 DIAGNOSIS — L30.9 HAND DERMATITIS: ICD-10-CM

## 2025-05-02 RX ORDER — HALOBETASOL PROPIONATE 0.5 MG/G
CREAM TOPICAL 2 TIMES DAILY
Qty: 150 G | Refills: 1 | OUTPATIENT
Start: 2025-05-02

## 2025-05-08 ENCOUNTER — PATIENT MESSAGE (OUTPATIENT)
Dept: NEUROLOGY | Facility: CLINIC | Age: 65
End: 2025-05-08

## 2025-05-09 ENCOUNTER — TELEPHONE (OUTPATIENT)
Age: 65
End: 2025-05-09

## 2025-05-09 ENCOUNTER — PATIENT MESSAGE (OUTPATIENT)
Dept: NEUROLOGY | Facility: CLINIC | Age: 65
End: 2025-05-09

## 2025-05-09 DIAGNOSIS — G43.009 MIGRAINE WITHOUT AURA AND WITHOUT STATUS MIGRAINOSUS, NOT INTRACTABLE: Primary | ICD-10-CM

## 2025-05-09 RX ORDER — FREMANEZUMAB-VFRM 225 MG/1.5ML
INJECTION SUBCUTANEOUS
Qty: 1.5 ML | Refills: 11 | Status: SHIPPED | OUTPATIENT
Start: 2025-05-09 | End: 2025-05-13

## 2025-05-09 NOTE — TELEPHONE ENCOUNTER
See [employer] message    Jeny DEAN initiated on [facility]. (Key: [id number])     Awaiting determination   None

## 2025-05-09 NOTE — TELEPHONE ENCOUNTER
Inbound call received from Tashi at [facility] to clarify the Formulation of three of the medications:    Metoprolol, Propanolol, and Verapamil    Reference number [id number]    Outbound call made to Patient and she clarified it was too long ago to remember the dosages. Patient does remember all three medications were tablets.     Outbound call made to [facility] and they were made aware of Patient's response.They stated they will add it to the case.

## 2025-05-09 NOTE — PATIENT COMMUNICATION
During call for Cyproheptadine PA Patient addressed her [employer] message. Patient  states the emgality has not been working and she would like to try something else. Patient denied having a migraine at this time. Patient states Imitrex usually works. Patient said she had a migraine yesterday and took an Imitrex which worked right away.     Cinthia is seeking advisement regarding something to replace emgality but also stated she could wait for her appointment 6/16/2025.    Patient prefers [employer] or phone call to [phone] and we may leave a voicemail.     Dr. Mosley please review [employer] message as well and advise, thank you!

## 2025-05-09 NOTE — TELEPHONE ENCOUNTER
See [employer]ate message from 5/9 for more info.     Called keisha,   [id number]  [facility]  ID-[id number]  [phone]    We also received a fax from [employer] stating that pt has received a temporary supply of cyproheptadine on 4/15 and that drug is not on their drug list and will not continue to cover drug unless an exception is received thru plan.    Cyproheptadine PA completed on [facility]  Key: [id number]  If you have any questions about your PA submission, contact [employer] at [phone].

## 2025-05-12 NOTE — TELEPHONE ENCOUNTER
Called PA dept at [phone], spoke to Antonia. I was transferred to internal #. Waited for >5 min. While waiting on the line, I attempted to create a new key. Tried to submit it but same message from the plan-Invalid data was entered into one or more fields     Plan will not accept when I enter pt's tried and failed meds-  Amlodipine, Topamax, metoprolol, propanolol, lisinopril, Elavil, Pamelor, verapamil, emgality. So I remove this  info and attach office notes instead. I was able to send this PA to the plan.    Jeny DEAN initiated on [facility]. (Key: [id number])     Awaiting determination    If you have any questions about your PA submission, contact [employer] at [phone].

## 2025-05-13 NOTE — TELEPHONE ENCOUNTER
Aimovig PA completed on [facility]  Key: [id number]  If you have any questions about your PA submission, contact [email or web address] at [phone].

## 2025-05-13 NOTE — TELEPHONE ENCOUNTER
cyproheptadine approved from 4/1/25-5/9/26    Left message for pharm making them aware of the approval    [employer] message sent to pt-see 5/9 [employer] message

## 2025-05-14 NOTE — TELEPHONE ENCOUNTER
Aimovig PA approved through 5/13/26    Called and left a detailed message on pt's answering machine of the below and above and for a call back if any questions    Called [facility] pharmacy and left a message making them aware of the approval and for a cb if any questions.

## 2025-05-22 DIAGNOSIS — Z86.69 HX OF MIGRAINE HEADACHES: ICD-10-CM

## 2025-05-23 RX ORDER — SUMATRIPTAN SUCCINATE 100 MG/1
TABLET ORAL
Qty: 18 TABLET | Refills: 0 | Status: SHIPPED | OUTPATIENT
Start: 2025-05-23

## 2025-06-16 ENCOUNTER — OFFICE VISIT (OUTPATIENT)
Dept: NEUROLOGY | Facility: CLINIC | Age: 65
End: 2025-06-16
Payer: COMMERCIAL

## 2025-06-16 VITALS
HEIGHT: 59 IN | WEIGHT: 181 LBS | OXYGEN SATURATION: 95 % | SYSTOLIC BLOOD PRESSURE: 134 MMHG | HEART RATE: 109 BPM | BODY MASS INDEX: 36.49 KG/M2 | DIASTOLIC BLOOD PRESSURE: 80 MMHG | TEMPERATURE: 97.1 F

## 2025-06-16 DIAGNOSIS — G43.009 MIGRAINE WITHOUT AURA AND WITHOUT STATUS MIGRAINOSUS, NOT INTRACTABLE: Primary | ICD-10-CM

## 2025-06-16 DIAGNOSIS — E66.9 OBESITY (BMI 30-39.9): ICD-10-CM

## 2025-06-16 DIAGNOSIS — G47.33 OBSTRUCTIVE SLEEP APNEA: ICD-10-CM

## 2025-06-16 DIAGNOSIS — F41.9 ANXIETY DISORDER: ICD-10-CM

## 2025-06-16 PROCEDURE — G2211 COMPLEX E/M VISIT ADD ON: HCPCS | Performed by: STUDENT IN AN ORGANIZED HEALTH CARE EDUCATION/TRAINING PROGRAM

## 2025-06-16 PROCEDURE — 99214 OFFICE O/P EST MOD 30 MIN: CPT | Performed by: STUDENT IN AN ORGANIZED HEALTH CARE EDUCATION/TRAINING PROGRAM

## 2025-06-16 RX ORDER — CYPROHEPTADINE HYDROCHLORIDE 4 MG/1
4 TABLET ORAL 2 TIMES DAILY
Qty: 60 TABLET | Refills: 6 | Status: SHIPPED | OUTPATIENT
Start: 2025-06-16

## 2025-06-16 NOTE — ASSESSMENT & PLAN NOTE
At today's visit, she reports a slight improvement with her headaches.  She currently endorses about 6-8 headache days per month, but does not find them to be severe.  We are currently in the midst of switching her from Emgality to Aimovig monthly injections due to insurance issues and the fact that she felt that Emgality was less effective over time.  She was also interested in increasing the dose of her cyproheptadine since she has been doing well with that with regards to her allergy/sinus issues and also feels that it helps with her headaches.  From an abortive standpoint, we will have her continue with sumatriptan.  If we have difficulty treating her headaches going forward, we will once again need to discuss treating her sleep apnea as that is likely the cause of her frequent headaches.

## 2025-06-16 NOTE — PROGRESS NOTES
Since your last visit are your headaches - slightly Improved    Any change to the headache type? No    What is your current headache frequency (total headache days out of 30): 6-8/30 (of those, how many are more severe: 0)    Are you taking your current medications as prescribed? Starting Aimovig soon      How may days per week do you take an abortive medicine? 7/7  Advil/Tylenol (due to other issues) 2/7 weekly Sumatriptan

## 2025-06-16 NOTE — PROGRESS NOTES
Neurology Ambulatory Visit  Name: Gabriella Gruber       : 1960       MRN: [id number]   Encounter Provider: Sergio Mosley DO   Encounter Date: 2025  Encounter department: [facility]    Gabriella Gruber is a very pleasant 64 y.o. female with a past medical history that includes GERD, asthma, GCA, migraines, polymyalgia rheumatica, anxiety here for f/u evaluation of headache.      Assessment and Plan  1. Migraine without aura and without status migrainosus, not intractable  Assessment & Plan:  At today's visit, she reports a slight improvement with her headaches.  She currently endorses about 6-8 headache days per month, but does not find them to be severe.  We are currently in the midst of switching her from Emgality to Aimovig monthly injections due to insurance issues and the fact that she felt that Emgality was less effective over time.  She was also interested in increasing the dose of her cyproheptadine since she has been doing well with that with regards to her allergy/sinus issues and also feels that it helps with her headaches.  From an abortive standpoint, we will have her continue with sumatriptan.  If we have difficulty treating her headaches going forward, we will once again need to discuss treating her sleep apnea as that is likely the cause of her frequent headaches.  Orders:  -     cyproheptadine (PERIACTIN) 4 mg tablet; Take 1 tablet (4 mg total) by mouth in the morning and 1 tablet (4 mg total) before bedtime.  2. Anxiety disorder  3. Obesity (BMI 30-39.9)  4. Obstructive sleep apnea      She will Return in about 6 months (around 2025).    Workup  - MRI brain without contrast 2021: No acute findings.  No white matter changes.  - CT head/C-spine without contrast 2023: No acute intracranial findings.  Multilevel degenerative changes in cervical spine (I have personally reviewed imaging and radiology read)     Preventative:  - we discussed  headache hygiene and lifestyle factors that may improve headaches  - Aimovig  - Cyproheptadine 4mg BID  - Currently on through other providers: Cymbalta (mood), Gabapentin (pain), Prednisone, Triamterene-HCTZ (menopause)  - Past/ failed/contraindicated: Amlodipine, Topamax, metoprolol, propanolol, lisinopril, Elavil, Pamelor, verapamil, Emgality monthly injection (stopped helping, insurance issues)  - future options: Memantine, Diamox, CGRP med, botox     Acute:  - discussed not taking over-the-counter or prescription pain medications more than 3 days per week to prevent medication overuse/rebound headache  - Sumatriptan 50-100mg  - Currently on through other providers: Methocarbamol  - Past/ failed/contraindicated: Fioricet, Amerge, Maxalt, Zomig, avoid steroids as she is already on daily steroids  - future options:  Triptan, prochlorperazine, Toradol IM or p.o., could consider trial of 5 days of Depakote 500 mg nightly, ubrelvy, reyvow, nurtec, zavzpret    History of Present Illness       Interval updates:  6/16/25: Since her last visit, she reached out because she did not feel that Emgality was as effective, especially when eating sugary foods.  She was interested in trying something different so I switched her to Aimovig. At today's visit, she reports a slight improvement in terms of her headaches.  She currently endorses about 6-8 headache days per month of which none are severe.  She has not yet started Aimovig, but reports that she should be receiving it soon. She is taking Cyproheptadine and finds it to be helpful with her headaches and her sinus issues. She is taking daily Tylenol/Advil for other issues and uses sumatriptan about 2 times per week. She reports a mechanical fall after tripping on Saturday. No head injury or LOC.     Prior History  12/13/24: At todays visit, she reports about 8-12 headache days per month. She is using Emgality monthly injections without any issues., but does not find it to be  effective. She liked the course of cyproheptadine. From an abortive standpoint, sumatriptan is effective. She thinks her back may be playing a role - had a laminectomy and fusion in July 2024. Pending an TARA for the cervical spine in the near future.   6/7/24: At today's visit, she reports daily headaches as of about 6 weeks ago (attributes it to allergies), but prior to that she was experiencing about 8 headache days per month.  She has been using Emgality monthly without any significant issues. From an abortive standpoint, sumatriptan tends to work well.  Outside of headaches, she endorses ongoing back pain for which she is pending follow-up imaging.  11/21/23: Since our last visit, she was seen in the ED on 5/28/2023 after a fall.  With this she reports a brief LOC for a few seconds and hit her occiput.  Following this she took a few days off of work due to dizziness. She also reports diffuse pain, but is unable to comment on headaches specifically. At today's visit, she reports improvement in terms of her headaches.  She currently experiences about 8-12/30 headache days per month.  She continues with Emgality monthly injections and finds that they have helped with the severity of her headaches.  From an abortive standpoint, she continues with sumatriptan.  5/12/23: Ms. Gruber reports a longstanding history of migraines since she was a teenager.  She was previously evaluated by neurology, but has not been seen by 1 in the last 10 years.  To date, she has tried multiple medications for migraine management without significant benefit.  We discussed a variety of potential treatment options, but she was interested in trying Emgality monthly injections.  From an abortive standpoint, sumatriptan works very well for her so I will have her continue with that.  She did report a history of obstructive sleep apnea, but stated that she does not use a CPAP because she was unable to tolerate it.  Currently she uses a  "mouthguard and nasal strips.  I emphasized that if we have difficulty treating her headaches going forward I will have her see our sleep medicine team for discussion of treatment options regarding her sleep apnea.         Objective     Physical Exam:                                                               Vitals:            Constitutional:    /80 (BP Location: Left arm, Patient Position: Sitting, Cuff Size: Large)   Pulse (!) 109   Temp (!) 97.1 °F (36.2 °C) (Temporal)   Ht 4' 11\" (1.499 m)   Wt 82.1 kg (181 lb)   SpO2 95%   BMI 36.56 kg/m²   BP Readings from Last 3 Encounters:   06/16/25 134/80   12/13/24 132/86   07/12/24 122/76     Pulse Readings from Last 3 Encounters:   06/16/25 (!) 109   12/13/24 95   07/12/24 77         Well developed, well nourished, well groomed. No dysmorphic features.       HEENT:  Normocephalic atraumatic. See neuro exam   Chest:  Respirations appear regular and unlabored.    Cardiovascular:  no observed significant swelling.    Musculoskeletal:  (see below under neurologic exam for evaluation of motor function and gait)   Skin:  warm and dry, not diaphoretic.    Psychiatric:  Normal behavior and appropriate affect       Neurological Examination:     Mental status/cognitive function:   Recent and remote memory intact. Attention span and concentration as well as fund of knowledge are appropriate for age. Normal language and spontaneous speech.  Cranial Nerves:  III, IV, VI-Pupils were equal, round. Extraocular movements were full and conjugate   VII-facial expression symmetric  VIII-hearing grossly intact bilaterally   Motor Exam: symmetric bulk throughout. no atrophy, fasciculations or abnormal movements noted.   Coordination:  no apparent dysmetria, ataxia or tremor noted  Gait: steady casual gait      Voice recognition software was used in the generation of this note. There may be unintentional errors including grammatical errors, spelling errors, or pronoun errors. "

## 2025-06-16 NOTE — PATIENT INSTRUCTIONS
Headache Calendar  Please maintain a headache calendar  Consider using phone applications such as Migraine Fox or [place] Migraine Tracker     Headache/migraine treatment:   Acute medications (for immediate treatment of a headache):   It is ok to take ibuprofen, acetaminophen or naproxen (Advil, Tylenol,  Aleve, Excedrin) if they help your headaches you should limit these to No more than 2-3 times a week to avoid medication overuse/rebound headaches.      For your more moderate to severe migraines take this medication early  Imitrex (Sumatriptan) 100mg tabs - take 1/2-1 tablet at the onset of headache. May repeat one time after 2 hours if pain has not resolved.   (Max 2 a day and 10 a month)     Prescription preventive medications for headaches/migraines   Aimovig monthly injection  Cyproheptadine - 4mg twice daily     Lifestyle Recommendations:  [x] SLEEP - Maintain a regular sleep schedule: Adults need at least 7-8 hours of uninterrupted a night. Maintain good sleep hygiene:  Going to bed and waking up at consistent times, avoiding excessive daytime naps, avoiding caffeinated beverages in the evening, avoid excessive stimulation in the evening and generally using bed primarily for sleeping.  One hour before bedtime would recommend turning lights down lower, decreasing your activity (may read quietly, listen to music at a low volume). When you get into bed, should eliminate all technology (no texting, emailing, playing with your phone, iPad or tablet in bed).  [x] HYDRATION - Maintain good hydration.  Drink  2L of fluid a day (4 typical small water bottles)  [x] DIET - Maintain good nutrition. In particular don't skip meals and try and eat healthy balanced meals regularly.  [x] TRIGGERS - Look for other triggers and avoid them: Limit caffeine to 1-2 cups a day or less. Avoid dietary triggers that you have noticed bring on your headaches (this could include aged cheese, peanuts, MSG, aspartame and nitrates).  [x]  EXERCISE - physical exercise as we all know is good for you in many ways, and not only is good for your heart, but also is beneficial for your mental health, cognitive health and  chronic pain/headaches. I would encourage at the least 5 days of physical exercise weekly for at least 30 minutes.      Education and Follow-up  [x] Please call with any questions or concerns. Of course if any new concerning symptoms go to the emergency department.  [x] Follow up in 6 months

## 2025-07-01 ENCOUNTER — VBI (OUTPATIENT)
Dept: ADMINISTRATIVE | Facility: OTHER | Age: 65
End: 2025-07-01

## 2025-07-01 NOTE — TELEPHONE ENCOUNTER
07/01/25 1:26 PM     Chart reviewed for Mammogram ; nothing is submitted to the patient's insurance at this time.     Nicole Cortez MA   [facility]

## 2025-07-19 ENCOUNTER — TELEPHONE (OUTPATIENT)
Dept: FAMILY MEDICINE CLINIC | Facility: CLINIC | Age: 65
End: 2025-07-19

## 2025-07-19 DIAGNOSIS — Z86.69 HX OF MIGRAINE HEADACHES: ICD-10-CM

## 2025-07-19 DIAGNOSIS — G43.009 MIGRAINE WITHOUT AURA AND WITHOUT STATUS MIGRAINOSUS, NOT INTRACTABLE: ICD-10-CM

## 2025-07-21 RX ORDER — ERENUMAB-AOOE 140 MG/ML
INJECTION, SOLUTION SUBCUTANEOUS
Qty: 1 ML | Refills: 11 | Status: SHIPPED | OUTPATIENT
Start: 2025-07-21

## 2025-07-24 RX ORDER — SUMATRIPTAN SUCCINATE 100 MG/1
TABLET ORAL
Qty: 18 TABLET | Refills: 11 | OUTPATIENT
Start: 2025-07-24

## 2025-07-25 NOTE — TELEPHONE ENCOUNTER
Patient called and scheduled her ANNUAL WELLNESS VISIT welcome to medicare visit for 9/25    She then asked about medication - let her know that should be filled by neurology per message.  She hadn't read the [employer] message yet.